# Patient Record
Sex: FEMALE | Race: ASIAN | NOT HISPANIC OR LATINO
[De-identification: names, ages, dates, MRNs, and addresses within clinical notes are randomized per-mention and may not be internally consistent; named-entity substitution may affect disease eponyms.]

---

## 2018-03-13 ENCOUNTER — APPOINTMENT (OUTPATIENT)
Dept: PULMONOLOGY | Facility: CLINIC | Age: 63
End: 2018-03-13
Payer: MEDICAID

## 2018-03-13 VITALS
RESPIRATION RATE: 16 BRPM | OXYGEN SATURATION: 98 % | HEIGHT: 64 IN | TEMPERATURE: 97.7 F | BODY MASS INDEX: 22.2 KG/M2 | WEIGHT: 130 LBS | HEART RATE: 56 BPM | DIASTOLIC BLOOD PRESSURE: 70 MMHG | SYSTOLIC BLOOD PRESSURE: 100 MMHG

## 2018-03-13 DIAGNOSIS — G47.33 OBSTRUCTIVE SLEEP APNEA (ADULT) (PEDIATRIC): ICD-10-CM

## 2018-03-13 PROCEDURE — 99204 OFFICE O/P NEW MOD 45 MIN: CPT

## 2018-04-29 ENCOUNTER — OUTPATIENT (OUTPATIENT)
Dept: OUTPATIENT SERVICES | Facility: HOSPITAL | Age: 63
LOS: 1 days | End: 2018-04-29
Payer: MEDICAID

## 2018-04-29 ENCOUNTER — APPOINTMENT (OUTPATIENT)
Dept: SLEEP CENTER | Facility: CLINIC | Age: 63
End: 2018-04-29
Payer: MEDICAID

## 2018-04-29 DIAGNOSIS — Z98.89 OTHER SPECIFIED POSTPROCEDURAL STATES: Chronic | ICD-10-CM

## 2018-04-29 PROCEDURE — 95810 POLYSOM 6/> YRS 4/> PARAM: CPT

## 2018-04-29 PROCEDURE — 95810 POLYSOM 6/> YRS 4/> PARAM: CPT | Mod: 26

## 2018-04-30 DIAGNOSIS — G47.33 OBSTRUCTIVE SLEEP APNEA (ADULT) (PEDIATRIC): ICD-10-CM

## 2018-05-17 ENCOUNTER — RESULT REVIEW (OUTPATIENT)
Age: 63
End: 2018-05-17

## 2018-10-24 ENCOUNTER — APPOINTMENT (OUTPATIENT)
Dept: UROLOGY | Facility: CLINIC | Age: 63
End: 2018-10-24
Payer: MEDICAID

## 2018-10-24 VITALS
OXYGEN SATURATION: 97 % | WEIGHT: 139 LBS | RESPIRATION RATE: 17 BRPM | HEART RATE: 61 BPM | DIASTOLIC BLOOD PRESSURE: 66 MMHG | SYSTOLIC BLOOD PRESSURE: 121 MMHG | HEIGHT: 61.81 IN | BODY MASS INDEX: 25.58 KG/M2 | TEMPERATURE: 97.6 F

## 2018-10-24 PROCEDURE — 99203 OFFICE O/P NEW LOW 30 MIN: CPT

## 2018-10-24 RX ORDER — PRAVASTATIN SODIUM 40 MG/1
40 TABLET ORAL
Qty: 30 | Refills: 0 | Status: COMPLETED | COMMUNITY
Start: 2017-11-30

## 2018-10-24 RX ORDER — ASPIRIN ENTERIC COATED TABLETS 81 MG 81 MG/1
81 TABLET, DELAYED RELEASE ORAL
Qty: 30 | Refills: 0 | Status: ACTIVE | COMMUNITY
Start: 2018-08-21

## 2018-10-24 RX ORDER — ERYTHROMYCIN 5 MG/G
5 OINTMENT OPHTHALMIC
Qty: 4 | Refills: 0 | Status: COMPLETED | COMMUNITY
Start: 2018-06-27

## 2018-10-24 RX ORDER — LANSOPRAZOLE 15 MG/1
15 CAPSULE, DELAYED RELEASE ORAL
Qty: 60 | Refills: 0 | Status: ACTIVE | COMMUNITY
Start: 2018-03-08

## 2018-10-24 RX ORDER — MAGNESIUM OXIDE 400 MG
400 (241.3 MG) TABLET ORAL
Qty: 30 | Refills: 0 | Status: ACTIVE | COMMUNITY
Start: 2018-10-17

## 2018-10-24 RX ORDER — ISOPROPYL ALCOHOL 70 G/100G
70 LIQUID TOPICAL
Qty: 100 | Refills: 0 | Status: COMPLETED | COMMUNITY
Start: 2018-01-02

## 2018-10-24 RX ORDER — LOSARTAN POTASSIUM AND HYDROCHLOROTHIAZIDE 25; 100 MG/1; MG/1
100-25 TABLET ORAL
Qty: 30 | Refills: 0 | Status: COMPLETED | COMMUNITY
Start: 2018-07-19

## 2018-10-24 RX ORDER — MELOXICAM 7.5 MG/1
7.5 TABLET ORAL
Qty: 30 | Refills: 0 | Status: COMPLETED | COMMUNITY
Start: 2018-10-17

## 2018-10-24 RX ORDER — BLOOD SUGAR DIAGNOSTIC
STRIP MISCELLANEOUS
Qty: 50 | Refills: 0 | Status: COMPLETED | COMMUNITY
Start: 2018-01-02

## 2018-10-24 RX ORDER — ATORVASTATIN CALCIUM 40 MG/1
40 TABLET, FILM COATED ORAL
Qty: 30 | Refills: 0 | Status: ACTIVE | COMMUNITY
Start: 2018-09-07

## 2018-10-24 RX ORDER — ISOSORBIDE MONONITRATE 30 MG/1
30 TABLET, EXTENDED RELEASE ORAL
Qty: 30 | Refills: 0 | Status: COMPLETED | COMMUNITY
Start: 2018-07-30

## 2018-10-24 RX ORDER — RANITIDINE 300 MG/1
300 TABLET ORAL
Qty: 30 | Refills: 0 | Status: COMPLETED | COMMUNITY
Start: 2018-01-27

## 2018-10-24 RX ORDER — POLYVINYL ALCOHOL 14 MG/ML
1.4 SOLUTION/ DROPS OPHTHALMIC
Qty: 15 | Refills: 0 | Status: ACTIVE | COMMUNITY
Start: 2018-01-25

## 2018-10-24 RX ORDER — LANCETS 30 GAUGE
EACH MISCELLANEOUS
Qty: 100 | Refills: 0 | Status: COMPLETED | COMMUNITY
Start: 2018-01-02

## 2018-10-24 RX ORDER — VITAMIN B COMPLEX
CAPSULE ORAL
Qty: 30 | Refills: 0 | Status: ACTIVE | COMMUNITY
Start: 2018-10-17

## 2018-10-24 RX ORDER — CLOPIDOGREL BISULFATE 75 MG/1
75 TABLET, FILM COATED ORAL
Qty: 30 | Refills: 0 | Status: DISCONTINUED | COMMUNITY
Start: 2018-08-21

## 2018-10-24 RX ORDER — ERGOCALCIFEROL 1.25 MG/1
1.25 MG CAPSULE, LIQUID FILLED ORAL
Qty: 4 | Refills: 0 | Status: ACTIVE | COMMUNITY
Start: 2018-04-19

## 2018-10-24 RX ORDER — TRIAMCINOLONE ACETONIDE 1 MG/G
0.1 CREAM TOPICAL
Qty: 80 | Refills: 0 | Status: COMPLETED | COMMUNITY
Start: 2018-06-27

## 2018-10-24 RX ORDER — VALSARTAN AND HYDROCHLOROTHIAZIDE 320; 25 MG/1; MG/1
320-25 TABLET, FILM COATED ORAL
Qty: 30 | Refills: 0 | Status: COMPLETED | COMMUNITY
Start: 2018-04-19

## 2018-10-26 LAB
APPEARANCE: CLEAR
BACTERIA UR CULT: NORMAL
BACTERIA: NEGATIVE
BILIRUBIN URINE: NEGATIVE
BLOOD URINE: NEGATIVE
COLOR: YELLOW
GLUCOSE QUALITATIVE U: NEGATIVE MG/DL
HYALINE CASTS: 0 /LPF
KETONES URINE: NEGATIVE
LEUKOCYTE ESTERASE URINE: NEGATIVE
MICROSCOPIC-UA: NORMAL
NITRITE URINE: NEGATIVE
PH URINE: 7.5
PROTEIN URINE: ABNORMAL MG/DL
RED BLOOD CELLS URINE: 4 /HPF
SPECIFIC GRAVITY URINE: 1.02
SQUAMOUS EPITHELIAL CELLS: 1 /HPF
UROBILINOGEN URINE: NEGATIVE MG/DL
WHITE BLOOD CELLS URINE: 1 /HPF

## 2018-12-19 ENCOUNTER — APPOINTMENT (OUTPATIENT)
Dept: UROGYNECOLOGY | Facility: CLINIC | Age: 63
End: 2018-12-19

## 2018-12-19 DIAGNOSIS — Z87.440 PERSONAL HISTORY OF URINARY (TRACT) INFECTIONS: ICD-10-CM

## 2019-11-13 ENCOUNTER — APPOINTMENT (OUTPATIENT)
Dept: UROGYNECOLOGY | Facility: CLINIC | Age: 64
End: 2019-11-13
Payer: MEDICAID

## 2019-11-13 VITALS
DIASTOLIC BLOOD PRESSURE: 70 MMHG | BODY MASS INDEX: 25.68 KG/M2 | HEART RATE: 60 BPM | WEIGHT: 136 LBS | HEIGHT: 61 IN | SYSTOLIC BLOOD PRESSURE: 145 MMHG

## 2019-11-13 PROCEDURE — 99204 OFFICE O/P NEW MOD 45 MIN: CPT | Mod: 25

## 2019-11-13 PROCEDURE — 51798 US URINE CAPACITY MEASURE: CPT

## 2019-11-13 NOTE — PHYSICAL EXAM
[Well developed] : well developed [No Acute Distress] : in no acute distress [Well Nourished] : ~L well nourished [Oriented x3] : oriented to person, place, and time [Good Hygeine] : demonstrates good hygeine [Normal Memory] : ~T memory was ~L unimpaired [Normal Mood/Affect] : mood and affect are normal [Respirations regular] : ~T respiratory rate was regular [Normal Lung Sounds] : the lungs were clear to auscultation [Rate & Rhythm Regular] : ~T heart rate and rhythm were normal [No Edema] : ~T edema was not present [Symmetrical] : the neck was ~L symmetrical [Supple] : ~T the neck demonstrated no ~M decrease in suppleness [Thyroid Normal] : the thyroid ~T showed no abnormalities [Normal Gait] : gait was normal [Labia Majora] : were normal [Labia Minora] : were normal [Bartholin's Gland] : both Bartholin's glands were normal  [Normal Appearance] : general appearance was normal [Atrophy] : atrophy [No Bleeding] : there was no active vaginal bleeding [Exam Deferred] : was deferred [Normal] : no abnormalities [Dry Mucosa] : dry mucosa [1] : 1 [Aa ____] : Aa [unfilled] [Ba ____] : Ba [unfilled] [C ____] : C [unfilled] [GH ____] : GH [unfilled] [PB ____] : PB [unfilled] [TVL ____] : TVL  [unfilled] [Ap ____] : Ap [unfilled] [Bp ____] : Bp [unfilled] [D ____] : D [unfilled] [] : II [Post Void Residual ____ml] : post void residual via catheterization was [unfilled] ml [Anxiety] : patient is not anxious [Cough] : no cough [Murmurs] : a ~P ~M murmur was heard [Dyspnea] : no dyspnea [Tracheal Deviation] : no tracheal deviation observed [Mass] : no ~M [unfilled] neck mass was observed [Varicose Veins] : no varicose veins observed [Tenderness] : ~T no ~M abdominal tenderness observed [Mass (___ Cm)] : no ~M [unfilled] abdominal mass was palpated [H/Smegaly] : no hepatosplenomegaly [Distended] : not distended [Estrogen Effect] : no estrogen effect was observed [Hernia] : no hernia observed

## 2019-11-13 NOTE — HISTORY OF PRESENT ILLNESS
[FreeTextEntry1] : The pt is a 63 y/o P2 with DENIS for yrs, bothersome level is 6/10.\par She leaks urine with laughing, coughing but not with urgency.\par She feels incomplete emptying of her bladder and needs to double void.\par She voids hrly with a medium volume and has nocturia 2-3x with a small volume for 20 yrs.  She is extremely bothered by this.\par Her liquid intake consists of 3-4 glasses of water,1 cup of tea\par She has a BM 2-3/wk. She often takes a laxative to have a BM and reports that she has no sensation to defecate.  Denies FI.  She is scheduled for colonoscopy is scheduled for next week.\par She denies gross hematuria, recurrent UTIs, hx of nephrolithiaisis, ?vaginal bulge,\par Her last PAP was 2016 , and denies a hx of abnormal PAP\par Her last intercourse was >10 yrs ago, reports dyspareunia. her  is not interested in having intercourse.\par She denies having abdominal surgery in the past.\par

## 2019-11-18 ENCOUNTER — RESULT REVIEW (OUTPATIENT)
Age: 64
End: 2019-11-18

## 2019-11-18 LAB
BACTERIA UR CULT: NORMAL
CYTOLOGY CVX/VAG DOC THIN PREP: ABNORMAL

## 2019-12-11 ENCOUNTER — APPOINTMENT (OUTPATIENT)
Dept: UROGYNECOLOGY | Facility: CLINIC | Age: 64
End: 2019-12-11
Payer: MEDICAID

## 2019-12-11 PROCEDURE — 51798 US URINE CAPACITY MEASURE: CPT

## 2019-12-11 PROCEDURE — 51729 CYSTOMETROGRAM W/VP&UP: CPT

## 2019-12-11 PROCEDURE — 51741 ELECTRO-UROFLOWMETRY FIRST: CPT

## 2019-12-11 PROCEDURE — 51784 ANAL/URINARY MUSCLE STUDY: CPT

## 2019-12-11 PROCEDURE — 51797 INTRAABDOMINAL PRESSURE TEST: CPT

## 2019-12-18 ENCOUNTER — APPOINTMENT (OUTPATIENT)
Dept: UROGYNECOLOGY | Facility: CLINIC | Age: 64
End: 2019-12-18
Payer: MEDICAID

## 2019-12-18 PROCEDURE — 99214 OFFICE O/P EST MOD 30 MIN: CPT

## 2019-12-18 NOTE — ASSESSMENT
[FreeTextEntry1] : Various tx options including pessary, pelvic floor PT and surgery were dw the pt via [place] .\par She opted for surgery.\par We discussed A&P repair with TVT or TVH, BSO with USLS with TVT.  \par She opted to have TVH, BSO, USLS, TVT and possible A&P repair in March as she is planning to go to [facility] in Feb to be with her daughter.

## 2019-12-19 ENCOUNTER — TRANSCRIPTION ENCOUNTER (OUTPATIENT)
Age: 64
End: 2019-12-19

## 2019-12-29 ENCOUNTER — MOBILE ON CALL (OUTPATIENT)
Age: 64
End: 2019-12-29

## 2019-12-30 ENCOUNTER — OTHER (OUTPATIENT)
Age: 64
End: 2019-12-30

## 2019-12-30 DIAGNOSIS — Z86.39 PERSONAL HISTORY OF OTHER ENDOCRINE, NUTRITIONAL AND METABOLIC DISEASE: ICD-10-CM

## 2019-12-30 DIAGNOSIS — Z83.3 FAMILY HISTORY OF DIABETES MELLITUS: ICD-10-CM

## 2020-01-13 ENCOUNTER — APPOINTMENT (OUTPATIENT)
Dept: UROGYNECOLOGY | Facility: CLINIC | Age: 65
End: 2020-01-13

## 2020-01-21 ENCOUNTER — APPOINTMENT (OUTPATIENT)
Dept: UROGYNECOLOGY | Facility: HOSPITAL | Age: 65
End: 2020-01-21

## 2020-01-22 ENCOUNTER — APPOINTMENT (OUTPATIENT)
Dept: UROGYNECOLOGY | Facility: CLINIC | Age: 65
End: 2020-01-22
Payer: MEDICAID

## 2020-01-29 ENCOUNTER — APPOINTMENT (OUTPATIENT)
Dept: UROGYNECOLOGY | Facility: CLINIC | Age: 65
End: 2020-01-29
Payer: MEDICAID

## 2020-01-29 DIAGNOSIS — N95.2 POSTMENOPAUSAL ATROPHIC VAGINITIS: ICD-10-CM

## 2020-01-29 DIAGNOSIS — N39.3 STRESS INCONTINENCE (FEMALE) (MALE): ICD-10-CM

## 2020-01-29 PROCEDURE — A4562: CPT

## 2020-01-29 PROCEDURE — 57160 INSERT PESSARY/OTHER DEVICE: CPT

## 2020-01-29 PROCEDURE — 99213 OFFICE O/P EST LOW 20 MIN: CPT | Mod: 25

## 2020-01-29 NOTE — PROCEDURE
[Good Fit] : fits well [Pessary Inserted] : inserted [None] : no bleeding [Erosion] : no evidence of erosion [FreeTextEntry1] : ring with support #1

## 2020-01-29 NOTE — HISTORY OF PRESENT ILLNESS
[FreeTextEntry1] : The pt is here for pessary fitting for stage 2 bothersome UV prolapse.\par She was ready to proceed with surgery but then decided to post pone as she is traveling to [place] till March.\par

## 2020-01-29 NOTE — ASSESSMENT
[FreeTextEntry1] : Pessary fitted.  She will f/u tomorrow for pessary teaching.\par She was encouraged to apply estrogen cream

## 2020-01-30 ENCOUNTER — APPOINTMENT (OUTPATIENT)
Dept: UROGYNECOLOGY | Facility: CLINIC | Age: 65
End: 2020-01-30
Payer: MEDICAID

## 2020-01-30 PROCEDURE — 99213 OFFICE O/P EST LOW 20 MIN: CPT

## 2020-02-01 NOTE — HISTORY OF PRESENT ILLNESS
[FreeTextEntry1] : The pt was fitted with size 1 ring with pessary yesterday for prolapse.\par Denies any issues with pessary. Denies any DENIS since placement.

## 2020-02-01 NOTE — ASSESSMENT
[FreeTextEntry1] : Patient taught pessary self care. Patient demonstrated proper removal and insertion by teach back.\par Patient to [place] in March for follow up after she has returned from her trip.

## 2020-02-01 NOTE — PHYSICAL EXAM
[Well developed] : well developed [Well Nourished] : ~L well nourished [Good Hygeine] : demonstrates good hygeine [Normal] : normal external genitalia [Atrophy] : atrophy [Labia Minora] : were normal [No Bleeding] : there was no active vaginal bleeding [No Acute Distress] : in acute distress

## 2020-03-11 ENCOUNTER — APPOINTMENT (OUTPATIENT)
Dept: UROGYNECOLOGY | Facility: CLINIC | Age: 65
End: 2020-03-11

## 2020-05-27 ENCOUNTER — APPOINTMENT (OUTPATIENT)
Dept: UROGYNECOLOGY | Facility: CLINIC | Age: 65
End: 2020-05-27
Payer: MEDICAID

## 2020-05-28 ENCOUNTER — APPOINTMENT (OUTPATIENT)
Dept: UROGYNECOLOGY | Facility: CLINIC | Age: 65
End: 2020-05-28
Payer: MEDICAID

## 2020-05-28 PROCEDURE — 99213 OFFICE O/P EST LOW 20 MIN: CPT

## 2020-05-31 NOTE — PHYSICAL EXAM
[Well Nourished] : ~L well nourished [Well developed] : well developed [Good Hygeine] : demonstrates good hygeine [Normal Memory] : ~T memory was ~L unimpaired [Oriented x3] : oriented to person, place, and time [Normal Mood/Affect] : mood and affect are normal [Labia Minora] : were normal [Labia Majora] : were normal [Normal Appearance] : general appearance was normal [No Bleeding] : there was no active vaginal bleeding [Normal] : normal [No Acute Distress] : in acute distress

## 2020-05-31 NOTE — HISTORY OF PRESENT ILLNESS
[FreeTextEntry1] : The pt is here for pessary f/u.\par She was fitted with #1 ring with support and taught to self manage.\par Her last visit was 4 months ago.\par Patient reports she lost per pessary/dislodged when traveling to [facility] in March 2020. Patient stated she feels much better without the pessary. Patient is able to urinate without any issues.

## 2020-12-16 PROBLEM — Z87.440 HISTORY OF URINARY TRACT INFECTION: Status: RESOLVED | Noted: 2018-12-19 | Resolved: 2020-12-16

## 2024-02-01 ENCOUNTER — APPOINTMENT (OUTPATIENT)
Dept: GYNECOLOGIC ONCOLOGY | Facility: CLINIC | Age: 69
End: 2024-02-01
Payer: MEDICARE

## 2024-02-01 VITALS
DIASTOLIC BLOOD PRESSURE: 74 MMHG | SYSTOLIC BLOOD PRESSURE: 136 MMHG | BODY MASS INDEX: 25.3 KG/M2 | HEIGHT: 61 IN | OXYGEN SATURATION: 98 % | WEIGHT: 134 LBS | RESPIRATION RATE: 18 BRPM | HEART RATE: 77 BPM | TEMPERATURE: 98 F

## 2024-02-01 PROCEDURE — 99204 OFFICE O/P NEW MOD 45 MIN: CPT

## 2024-02-01 RX ORDER — AMLODIPINE BESYLATE 10 MG/1
10 TABLET ORAL
Refills: 0 | Status: ACTIVE | COMMUNITY

## 2024-02-01 RX ORDER — FENOFIBRATE 54 MG/1
54 TABLET ORAL
Refills: 0 | Status: ACTIVE | COMMUNITY

## 2024-02-01 RX ORDER — TELMISARTAN AND HYDROCHLOROTHIAZIDE 80; 25 MG/1; MG/1
80-25 TABLET ORAL
Refills: 0 | Status: ACTIVE | COMMUNITY

## 2024-02-01 RX ORDER — METFORMIN ER 750 MG 750 MG/1
750 TABLET ORAL
Refills: 0 | Status: ACTIVE | COMMUNITY

## 2024-02-01 RX ORDER — ASPIRIN 81 MG/1
81 TABLET ORAL
Refills: 0 | Status: ACTIVE | COMMUNITY

## 2024-02-01 RX ORDER — ACYCLOVIR 50 MG/G
5 OINTMENT TOPICAL
Refills: 0 | Status: ACTIVE | COMMUNITY

## 2024-02-01 RX ORDER — ERYTHROMYCIN 5 MG/G
5 OINTMENT OPHTHALMIC
Refills: 0 | Status: ACTIVE | COMMUNITY

## 2024-02-01 RX ORDER — ICOSAPENT ETHYL 1000 MG/1
1 CAPSULE ORAL
Refills: 0 | Status: ACTIVE | COMMUNITY

## 2024-02-01 RX ORDER — ATORVASTATIN CALCIUM 10 MG/1
10 TABLET, FILM COATED ORAL
Refills: 0 | Status: ACTIVE | COMMUNITY

## 2024-02-01 RX ORDER — SENNOSIDES 8.6 MG TABLETS 8.6 MG/1
8.6 TABLET ORAL
Refills: 0 | Status: ACTIVE | COMMUNITY

## 2024-02-01 RX ORDER — HYDROCORTISONE 1 %
12 CREAM (GRAM) TOPICAL
Refills: 0 | Status: ACTIVE | COMMUNITY

## 2024-02-01 NOTE — REVIEW OF SYSTEMS
[Negative] : Musculoskeletal [FreeTextEntry6] : paranasal sinus issue [FreeTextEntry9] : sinus issue

## 2024-02-01 NOTE — HISTORY OF PRESENT ILLNESS
[FreeTextEntry1] : 69 y/o  LMP 2003 approximately. In 12/2022 patient noted that she scratched something and then skin broke down.  She saw a dermatologist and did a lot of topical applcations.  She noticed that it blew up.  Derm suggested her to see a gynecologist.    Dr Nora Aquino [address] [facility] [phone]  She was given a work up including pap test, sonogram etc.  No further intervention was needed.  She then went  back to her dermatologist  She said she was cared for mostly by Luz Colby NP at [facility], but the person in charge is  Dr Susanna Shepard. [phone]  She continued care with freezing,  topical cream etc. She was advised to change to cotton underwear et Finally a biopsy was done in 11/2023  Pathology report showed extramammary paget disease  Patient has significant itching.  2/23 mammogram  BIRAD 3 10/23 ultrasound BIRAD 3 right side 7/23 CT of paranasal sinus  congenital developmenta hypoplasia  applies medication to the nose etc 2023 colonoscopy benign polyp noted pap done with gyn Sonogram 2/2022 pelvis small fibroids normal ovaries  patient at one point scheduled with Dr Bean here in this clinic for a urogyn procedure, TVH BSO uterosacral ligament suspention and a TVT back in 2019.  She postponted the procedure and went on a vacation in [place].  She then opted not to have surgery.  She had a pessary in place fora few years.  She has not seen another urogyn since Dr Martínez moved on.  She would like to see a specialist again in this office.  We will try to set it up for her to see Dr. En Shepard.

## 2024-02-01 NOTE — PHYSICAL EXAM
[Chaperone Present] : A chaperone was present in the examining room during all aspects of the physical examination [Absent] : CVAT: absent [Abnormal] : External genitalia: Abnormal [Normal] : Recto-Vaginal Exam: Normal

## 2024-02-01 NOTE — ASSESSMENT
[FreeTextEntry1] : The patient has a diagnosis of extramammary Paget's disease of the vulva.  This was confirmed by her dermatologist recently. We need to bring the slides here for a review. Physical examination of the perineum showed that in her case the Paget's disease is quite subtle, located on the left labia majora.  Patient understand that this disease is usually treated with surgical excision.  However we will need to review and confirm this pathology report first In addition she will need a workup that will include a CT scan, mammogram report which she has completed already, Pap test the results of which we will obtain, colonoscopy which she completed last year.  We will get a CT scan of the chest, abdomen and pelvis.  We will also get some tumor marker blood test including , CEA and CA 19-9.  I refer her to see a urogynecologist here.  The patient had a urological procedure scheduled back in 2019 before she opted for the vacation instead.  I am not sure that her symptoms is severe enough to warrant a procedure given that the initial planned surgery date was back in 2019.  However I will leave that decision to  Of the wound the patient was seen in the next 2 weeks.  In the meantime we will reconvene in about 2 weeks after we have reviewed the slides.  Will also order some of the tests that I discussed earlier.  I answered all question.  She knows to return in 2 weeks with further discussion.

## 2024-02-01 NOTE — CHIEF COMPLAINT
[FreeTextEntry1] : here for evaluation of her extrammary paget's disease diagnosed by her dermatologist  extramammary

## 2024-02-14 ENCOUNTER — APPOINTMENT (OUTPATIENT)
Dept: UROLOGY | Facility: CLINIC | Age: 69
End: 2024-02-14

## 2024-03-14 ENCOUNTER — APPOINTMENT (OUTPATIENT)
Dept: GYNECOLOGIC ONCOLOGY | Facility: CLINIC | Age: 69
End: 2024-03-14
Payer: MEDICARE

## 2024-03-14 ENCOUNTER — LABORATORY RESULT (OUTPATIENT)
Age: 69
End: 2024-03-14

## 2024-03-14 VITALS
DIASTOLIC BLOOD PRESSURE: 77 MMHG | HEART RATE: 63 BPM | RESPIRATION RATE: 18 BRPM | WEIGHT: 134 LBS | SYSTOLIC BLOOD PRESSURE: 138 MMHG | BODY MASS INDEX: 25.32 KG/M2 | TEMPERATURE: 97.5 F | OXYGEN SATURATION: 97 %

## 2024-03-14 DIAGNOSIS — N95.0 POSTMENOPAUSAL BLEEDING: ICD-10-CM

## 2024-03-14 PROCEDURE — 56605 BIOPSY OF VULVA/PERINEUM: CPT

## 2024-03-14 PROCEDURE — 56606 BIOPSY OF VULVA/PERINEUM: CPT

## 2024-03-14 PROCEDURE — 58100 BIOPSY OF UTERUS LINING: CPT

## 2024-03-14 PROCEDURE — 99214 OFFICE O/P EST MOD 30 MIN: CPT | Mod: 25

## 2024-03-14 RX ORDER — LIDOCAINE 4% 4 G/100G
4 CREAM TOPICAL
Qty: 1 | Refills: 2 | Status: ACTIVE | COMMUNITY
Start: 2024-03-14 | End: 1900-01-01

## 2024-03-14 RX ORDER — LIDOCAINE 30 MG/G
3 CREAM TOPICAL
Qty: 1 | Refills: 2 | Status: DISCONTINUED | COMMUNITY
Start: 2024-03-14 | End: 2024-03-14

## 2024-03-14 RX ORDER — BACITRACIN 500 [IU]/G
500 OINTMENT TOPICAL TWICE DAILY
Qty: 1 | Refills: 2 | Status: ACTIVE | COMMUNITY
Start: 2024-03-14 | End: 1900-01-01

## 2024-03-14 NOTE — REASON FOR VISIT
[Other: _____] : [unfilled] [FreeTextEntry1] : Patient is here to discuss the final pathology report review of her vulvar biopsy She is here by herself

## 2024-03-14 NOTE — HISTORY OF PRESENT ILLNESS
[FreeTextEntry1] : Georgian  Bennett: [id number]  Patient continues to complain of stabbing pain in the vulvar area.  She describes no vaginal bleeding. She had a vulvar biopsy back in January 2024 She was seen by me on February 1 It took us over the last 6 weeks to get the pathology slides reviewed here at [facility].  In addition we also obtain unstained slides and noted to do some IHC before we can finalize the consultation slide report. Explained to patient that she has Paget's disease with area of invasion which we cannot determine the depth due to how the specimen was procured.  Her main complaint is her stabbing pain/discomfort which she said that pushing it down on the skin actually improved her symptoms.  Her colonoscopy was done in 2023 by Dr. Jorge gastroenterology.  We will try to get that report.

## 2024-03-14 NOTE — PROCEDURE
[Endometrial Biopsy] : an endometrial biopsy [Vulvar Biopsy] : a vulvar biopsy [Other: ___] : [unfilled] [Postmenopausal Bleeding] : postmenopausal bleeding [Patient] : the patient [1% Lidocaine ___(ml)] :  [unfilled]Uml of 1% Lidocaine [Betadine] : betadine [Silver Nitrate] : silver nitrate [Yes] : the specimen was sent to pathology [No Complications] : none [Tolerated Well] : the patient tolerated the procedure well [Post procedure instructions and information given] : post procedure instructions and information given and reviewed with patient. [FreeTextEntry1] : bacitracin ointment written lidocaine 4% to apply to the area given her c/o stabbing pain

## 2024-03-14 NOTE — ASSESSMENT
[FreeTextEntry1] : The patient has extramammary Paget's disease of the vulvar with her outside slide finally reviewed here at [facility]. Paget's disease was noted with evidence of invasion the depth of which is not clear from the biopsy. Patient also complained of increasing area of discomfort, as she pointed to the labia minora on both the left and right side.  There is no evidence of  any lesion of expansion in this area.  Patient uses a Q-tip to press the skin and cleaned that it may take pain less severe.  Discussed extensively with patient regarding the standard of care for this disease which would be a wide radical excision.  Given the possibility of invasive disease discussed extensively regarding the potential need for sentinel inguinal lymph node mapping and biopsy.  Discussed the rationale for the sentinel node evaluation versus a lymphadenectomy.  Discussed need for a metastatic workup. She will have a total body imaging. In addition we will obtain the actual report of her colonoscopy from 2023 to assess the biopsy results. For the biopsy of the labia minora on both sides were performed in addition to an endometrial biopsy given the staining that was noted at the time of the examination today.  Once we gather all of these information hopefully we can present the case at our tumor board conference and will proceed to the actual treatment that I hope will commence sometimes early April.  Answered all her questions with the Tajik . Will be happy to answer more question if she or her daughter has any.

## 2024-03-14 NOTE — REVIEW OF SYSTEMS
[Negative] : Musculoskeletal [FreeTextEntry4] : vulva pain/discomfort in the area where paget's disease is

## 2024-03-14 NOTE — LETTER BODY
[FreeTextEntry2] : Carlitos Vergara MD [address] [facility] [address] [place] [address] Tel [phone]  Dear Dr Veragra   Ms Noel Aquino was seen in my office for an evaluation after finally having her outside slide reviewed here at [facility]  Please see my consult note for her plan of care.  Thank you for allowing me to participate in the care of this patient.    Please do not hesitate to call if you have any questions.    Most Sincerely,      Yvon Martínez MD [facility] Director, [facility] Division of Gynecologic Oncology Department Obstetrics and Gynecology Tel [phone] [email or web address]  [Attached please find my note.] : Attached please find my note. [FreeTextEntry1] : consultation pathology report

## 2024-03-14 NOTE — PHYSICAL EXAM
[Chaperone Present] : A chaperone was present in the examining room during all aspects of the physical examination [Absent] : CVAT: absent [Labia Majora] : normal left labia majora [Labia Majora Erythema Left] : erythema on the left [Abnormal] : Cervix: Abnormal [Normal] : Recto-Vaginal Exam: Normal [FreeTextEntry1] : very subtle erythema and positive hyperpigmentation noted [Fully active, able to carry on all pre-disease performance without restriction] : Status 0 - Fully active, able to carry on all pre-disease performance without restriction

## 2024-03-15 LAB
ALBUMIN SERPL ELPH-MCNC: 4.8 G/DL
ALP BLD-CCNC: 101 U/L
ALT SERPL-CCNC: 24 U/L
ANION GAP SERPL CALC-SCNC: 13 MMOL/L
AST SERPL-CCNC: 16 U/L
BASOPHILS # BLD AUTO: 0.08 K/UL
BASOPHILS NFR BLD AUTO: 1.1 %
BILIRUB SERPL-MCNC: 0.3 MG/DL
BUN SERPL-MCNC: 17 MG/DL
CALCIUM SERPL-MCNC: 10 MG/DL
CEA SERPL-MCNC: 2.4 NG/ML
CHLORIDE SERPL-SCNC: 105 MMOL/L
CO2 SERPL-SCNC: 25 MMOL/L
CREAT SERPL-MCNC: 0.63 MG/DL
EGFR: 97 ML/MIN/1.73M2
EOSINOPHIL # BLD AUTO: 0.34 K/UL
EOSINOPHIL NFR BLD AUTO: 4.7 %
GLUCOSE SERPL-MCNC: 134 MG/DL
HCT VFR BLD CALC: 39.6 %
HGB BLD-MCNC: 13.2 G/DL
IMM GRANULOCYTES NFR BLD AUTO: 0.3 %
LYMPHOCYTES # BLD AUTO: 2.75 K/UL
LYMPHOCYTES NFR BLD AUTO: 37.9 %
MAN DIFF?: NORMAL
MCHC RBC-ENTMCNC: 32 PG
MCHC RBC-ENTMCNC: 33.3 GM/DL
MCV RBC AUTO: 95.9 FL
MONOCYTES # BLD AUTO: 0.4 K/UL
MONOCYTES NFR BLD AUTO: 5.5 %
NEUTROPHILS # BLD AUTO: 3.67 K/UL
NEUTROPHILS NFR BLD AUTO: 50.5 %
PLATELET # BLD AUTO: 329 K/UL
POTASSIUM SERPL-SCNC: 3.9 MMOL/L
PROT SERPL-MCNC: 7.6 G/DL
RBC # BLD: 4.13 M/UL
RBC # FLD: 12.4 %
SODIUM SERPL-SCNC: 143 MMOL/L
WBC # FLD AUTO: 7.26 K/UL

## 2024-03-19 LAB — HPV HIGH+LOW RISK DNA PNL CVX: DETECTED

## 2024-03-21 LAB — CORE LAB BIOPSY: NORMAL

## 2024-03-27 ENCOUNTER — NON-APPOINTMENT (OUTPATIENT)
Age: 69
End: 2024-03-27

## 2024-03-28 ENCOUNTER — APPOINTMENT (OUTPATIENT)
Dept: GYNECOLOGIC ONCOLOGY | Facility: CLINIC | Age: 69
End: 2024-03-28
Payer: MEDICARE

## 2024-03-28 VITALS
DIASTOLIC BLOOD PRESSURE: 70 MMHG | SYSTOLIC BLOOD PRESSURE: 147 MMHG | RESPIRATION RATE: 18 BRPM | BODY MASS INDEX: 25.32 KG/M2 | HEART RATE: 62 BPM | TEMPERATURE: 98.1 F | WEIGHT: 134 LBS | OXYGEN SATURATION: 96 %

## 2024-03-28 PROCEDURE — 99459 PELVIC EXAMINATION: CPT

## 2024-03-28 PROCEDURE — 99214 OFFICE O/P EST MOD 30 MIN: CPT

## 2024-03-28 NOTE — HISTORY OF PRESENT ILLNESS
[FreeTextEntry1] : Azeri  Kirby [id number]  Return today to discuss the final plan of management for her extramammary Paget's disease of the vulva The patient was first seen by me back on February 1, 2024 for a outside biopsy shows Paget's disease.  Her examination of the perineum was not very obvious for Paget's disease.  The patient had to point out the area where the biopsy was performed.  The treatment management unfortunately was not made immediately.  We waited for quite a bit of time in order to obtain the outside slides for review here at [facility].  Furthermore our dermatopathologist requested unstained slides for further examination such as IHC staining.  The final report here indicated a invasive Paget's disease of the vulva.  The patient came 2 weeks ago with complaint of significant stabbing pain in the perineal area which has now extended to the right side of the vulva.  Additional biopsy adjacent to the labia minora on both sides were performed.  Paget's disease was noted on the left labia minora next to the affected lesion.  The right sided vulvar biopsy was negative for disease.  The patient was given bacitracin and lidocaine cream for her stabbing pain in the perineum.  She reported improvement on her discomfort.  In her last visit we explained to patient that she has Paget's disease with area of invasion which we cannot determine the depth due to how the specimen was procured. Her main complaint was her stabbing pain/discomfort which she said that pushing it down on the skin actually improved her symptoms which has improved with the medication prescribed.  Her PET CT scan showed no evidence of any metastatic disease.  Her colonoscopy was done in 2023 by Dr. Jorge gastroenterology.  Her report is in the chart Her sonogram of the breast in 10/2 3 was BI-RADS 3 HPV testing 3/14/2024 was negative An endometrial biopsy on 3/14/2024 was also negative

## 2024-03-28 NOTE — LETTER BODY
[Attached please find my note.] : Attached please find my note. [FreeTextEntry2] : Carlitos Vergara MD [address] [facility] [address] [place] [address] Tel [phone]  Dear  Dr Vergara   Ms Noel Aquino was seen in my office for a an evaluation and discussion on management of her Paget's disease.  Please see my consult note for her plan of care.  Thank you for allowing me to participate in the care of this patient.   Please do not hesitate to call if you have any questions.    Most Sincerely,      Yvon Martínez MD [facility] Director, [facility] Division of Gynecologic Oncology Department Obstetrics and Gynecology Tel [phone] [email or web address]

## 2024-03-28 NOTE — ASSESSMENT
[FreeTextEntry1] : Turkish  Kirby [id number] Patient is a invasive Paget's disease of the vulva up to at least 1 mm based on the tissue sent off for assessment.  There is no evidence of any metastatic disease. The case was discussed at tumor board conference  Our recommendation for the patient is to have a wide radical resection of the vulvar tumor with bilateral sentinel lymph node mapping and biopsy given that the lesion is close to the midline.  I illustrated a diagram to the patient and outlined the procedure to the patient Discussed use of ICG dye to help with the sentinel node mapping Discussed use of antibiotics DVT prophylaxis and possible transfusion. Discussed length of procedure which is about 3 hours plus Discussed possible complication including risk of peripheral edema which is minimized with sentinel node evaluation Discussed the need to have an overnight stay in the hospital. Discussed postoperative care and instruction. We are hopeful that surgery is the only treatment that is necessary.  But we will wait for the final pathology report before making a decision. I answered all her questions.  Specifically she asked if there is any alternative such as oral medication to take.  Unfortunately there is not  She will have a PST date She will need a medical clearance Procedure will be performed at [facility] We are aiming for 4/5/2024, next Friday for the procedure She signed consent form here in the office with a [place]   Procedure  Wide radical local resection of the vulva with sentinel lymph node mapping and biopsy   Indication  Invasive Paget's disease of the vulva   Benefits  Removal of diseased tissue   Risks  Discussed risks of blood loss, infection, injury to adjacent organs, potential need for transfusion and other events.   Discussed alternatives  None that is equivalent

## 2024-03-28 NOTE — PHYSICAL EXAM
[Chaperone Present] : A chaperone was present in the examining room during all aspects of the physical examination [Abnormal] : External genitalia: Abnormal [Absent] : Adnexa(ae): Absent [Normal] : Recto-Vaginal Exam: Normal [de-identified] : The biopsy sites were healing well.  Patient's perineum especially on the left side was palpated again there appeared to be adequate tissue for a primary closure after a excision of the vulvar and the subcutaneous tissue. [de-identified] : Grossly normal [de-identified] : Anteverted nontender mobile [Fully active, able to carry on all pre-disease performance without restriction] : Status 0 - Fully active, able to carry on all pre-disease performance without restriction

## 2024-04-01 ENCOUNTER — NON-APPOINTMENT (OUTPATIENT)
Age: 69
End: 2024-04-01

## 2024-04-01 ENCOUNTER — APPOINTMENT (OUTPATIENT)
Dept: CARDIOLOGY | Facility: CLINIC | Age: 69
End: 2024-04-01
Payer: MEDICARE

## 2024-04-01 VITALS
WEIGHT: 134 LBS | BODY MASS INDEX: 25.32 KG/M2 | TEMPERATURE: 98 F | OXYGEN SATURATION: 96 % | DIASTOLIC BLOOD PRESSURE: 67 MMHG | HEART RATE: 75 BPM | RESPIRATION RATE: 18 BRPM | SYSTOLIC BLOOD PRESSURE: 128 MMHG

## 2024-04-01 DIAGNOSIS — I25.10 ATHEROSCLEROTIC HEART DISEASE OF NATIVE CORONARY ARTERY W/OUT ANGINA PECTORIS: ICD-10-CM

## 2024-04-01 DIAGNOSIS — Z95.820 PERIPHERAL VASCULAR ANGIOPLASTY STATUS WITH IMPLANTS AND GRAFTS: ICD-10-CM

## 2024-04-01 DIAGNOSIS — Z01.810 ENCOUNTER FOR PREPROCEDURAL CARDIOVASCULAR EXAMINATION: ICD-10-CM

## 2024-04-01 DIAGNOSIS — R06.02 SHORTNESS OF BREATH: ICD-10-CM

## 2024-04-01 PROCEDURE — 99204 OFFICE O/P NEW MOD 45 MIN: CPT | Mod: 25

## 2024-04-01 PROCEDURE — 93000 ELECTROCARDIOGRAM COMPLETE: CPT | Mod: NC,59

## 2024-04-01 PROCEDURE — 93015 CV STRESS TEST SUPVJ I&R: CPT

## 2024-04-01 NOTE — PHYSICAL EXAM
[Well Nourished] : well nourished [Well Developed] : well developed [No Acute Distress] : no acute distress [Normal Venous Pressure] : normal venous pressure [Normal Conjunctiva] : normal conjunctiva [No Carotid Bruit] : no carotid bruit [No Murmur] : no murmur [Normal S1, S2] : normal S1, S2 [Clear Lung Fields] : clear lung fields [No Gallop] : no gallop [No Rub] : no rub [Good Air Entry] : good air entry [No Respiratory Distress] : no respiratory distress  [No Masses/organomegaly] : no masses/organomegaly [Soft] : abdomen soft [Non Tender] : non-tender [No Edema] : no edema [Normal Gait] : normal gait [Normal Bowel Sounds] : normal bowel sounds [No Clubbing] : no clubbing [No Cyanosis] : no cyanosis [No Rash] : no rash [No Varicosities] : no varicosities [No Skin Lesions] : no skin lesions [Normal Speech] : normal speech [No Focal Deficits] : no focal deficits [Moves all extremities] : moves all extremities [Normal memory] : normal memory [Alert and Oriented] : alert and oriented

## 2024-04-01 NOTE — ASSESSMENT
[FreeTextEntry1] : 68 year old F with Paget's dz of vulva, CAD s/p stent (5-6 years ago), HTN, HLD, DM who presents for pre-op cardiac clearance for GYN-ONC surgery (wide radical resection of the vulvar tumor with bilateral sentinel lymph node mapping and biopsy).  Pt follows with a different cardiologist yearly and reportedly last underwent treadmill stress last year which was normal. She has a history of CAD s/p 1 stent 5-6 years ago and she states she has been doing well since then. She denies CP, palpitations, dizziness, orthopnea, PND, LE edema, or syncope. She does endorse chronic SOB after climbing 2 flights of stairs. She self-discontinued her ASA 81 for the past month in anticipation of surgery.  1) Pre-op cardiac clearance, for GYN surgery 4/5/24 2) CAD s/p stent 3) SOB - Her RCRI score is 2 which elevates her cardiac risk for planned GYN surgery. - EKG 4/1/24 showed sinus rhythm without significant abnormalities - Given CAD history and reports of SOB, I advised pt to undergo treadmill stress test 4/1/24; pt did 10:13 min Jorge protocol, achieved 13.4 METs, and had no ischemic stress ECG changes - There is no evidence of active ACS, arrhythmia, clinical heart failure, or significant valvular disease on physical exam. - She is medically optimized from cardiac standpoint and may proceed to planned GYN surgery without further cardiac testing.  - She self-discontinued ASA 81mg x 1 month in anticipation of surgery. I advised pt to resume as soon as safe from bleeding/surgery perspective.  4) Follow-up, as needed (pt has a cardiologist that she sees but could not get appointment prior to surgery)

## 2024-04-01 NOTE — REASON FOR VISIT
[Other: ____] : [unfilled] [Coronary Artery Disease] : coronary artery disease [Source: ______] : History obtained from [unfilled]

## 2024-04-01 NOTE — HISTORY OF PRESENT ILLNESS
[FreeTextEntry1] : 68 year old F with Paget's dz of vulva, CAD s/p stent (5-6 years ago), HTN, HLD, DM who presents for pre-op cardiac clearance for GYN-ONC surgery (wide radical resection of the vulvar tumor with bilateral sentinel lymph node mapping and biopsy).  Meds: ASA 81, atorva 10, fenofibrate 54 daily, amlodipine 10, telmisartan-hctz 80-25mg daily EKG 4/1/24 showed sinus rhythm without significant abnormalities  Pt follows with a different cardiologist yearly and reportedly last underwent treadmill stress last year which was normal. She has a history of CAD s/p 1 stent 5-6 years ago and she states she has been doing well since then. She denies CP, palpitations, dizziness, orthopnea, PND, LE edema, or syncope. She does endorse chronic SOB after climbing 2 flights of stairs. She self-discontinued her ASA 81 for the past month in anticipation of surgery.

## 2024-04-02 ENCOUNTER — OUTPATIENT (OUTPATIENT)
Dept: OUTPATIENT SERVICES | Facility: HOSPITAL | Age: 69
LOS: 1 days | End: 2024-04-02

## 2024-04-02 VITALS
HEIGHT: 62 IN | RESPIRATION RATE: 16 BRPM | TEMPERATURE: 97 F | HEART RATE: 62 BPM | WEIGHT: 132.06 LBS | SYSTOLIC BLOOD PRESSURE: 132 MMHG | DIASTOLIC BLOOD PRESSURE: 74 MMHG | OXYGEN SATURATION: 96 %

## 2024-04-02 DIAGNOSIS — C51.9 MALIGNANT NEOPLASM OF VULVA, UNSPECIFIED: ICD-10-CM

## 2024-04-02 DIAGNOSIS — Z98.890 OTHER SPECIFIED POSTPROCEDURAL STATES: Chronic | ICD-10-CM

## 2024-04-02 DIAGNOSIS — I10 ESSENTIAL (PRIMARY) HYPERTENSION: ICD-10-CM

## 2024-04-02 DIAGNOSIS — I25.10 ATHEROSCLEROTIC HEART DISEASE OF NATIVE CORONARY ARTERY WITHOUT ANGINA PECTORIS: ICD-10-CM

## 2024-04-02 DIAGNOSIS — E11.9 TYPE 2 DIABETES MELLITUS WITHOUT COMPLICATIONS: ICD-10-CM

## 2024-04-02 DIAGNOSIS — C44.99 OTHER SPECIFIED MALIGNANT NEOPLASM OF SKIN, UNSPECIFIED: ICD-10-CM

## 2024-04-02 DIAGNOSIS — Z98.89 OTHER SPECIFIED POSTPROCEDURAL STATES: Chronic | ICD-10-CM

## 2024-04-02 LAB
BLD GP AB SCN SERPL QL: NEGATIVE — SIGNIFICANT CHANGE UP
RH IG SCN BLD-IMP: POSITIVE — SIGNIFICANT CHANGE UP
RH IG SCN BLD-IMP: POSITIVE — SIGNIFICANT CHANGE UP

## 2024-04-02 RX ORDER — SODIUM CHLORIDE 9 MG/ML
1000 INJECTION, SOLUTION INTRAVENOUS
Refills: 0 | Status: DISCONTINUED | OUTPATIENT
Start: 2024-04-05 | End: 2024-04-05

## 2024-04-02 NOTE — H&P PST ADULT - NSICDXPASTMEDICALHX_GEN_ALL_CORE_FT
PAST MEDICAL HISTORY:  CAD (coronary artery disease)     DM (diabetes mellitus)     Essential hypertension     Gastroesophageal reflux disease without esophagitis     H/O Paget's disease of bone     Hyperlipidemia, unspecified hyperlipidemia type     Left knee pain, unspecified chronicity

## 2024-04-02 NOTE — H&P PST ADULT - GENITOURINARY COMMENTS
Hx not examined Hx extramammary Pagets disease of vulva - pt c/o of occasional stabbing pain in vulva / pruritis

## 2024-04-02 NOTE — H&P PST ADULT - PROBLEM SELECTOR PLAN 4
Pt has stopped ASA 81 mg 3-4 weeks ago on own - Cardio aware and wants ASA to restart postop as per surgeon

## 2024-04-02 NOTE — H&P PST ADULT - HISTORY OF PRESENT ILLNESS
61 y/o female from home in [place] with left knee pain. Had it for last 4 months, Pain intermittent with ambulation not on any pain meds at present. 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

## 2024-04-02 NOTE — H&P PST ADULT - ATTENDING COMMENTS
Met patient at the surgical suite  Patient already signed consent form in the office a week ago.  Reiterated the planned procedure  Discussed post operative care and instruction  Pain meds  Transfusion unlikely but will be prepared  Will proceed as planned

## 2024-04-02 NOTE — H&P PST ADULT - NSICDXPASTSURGICALHX_GEN_ALL_CORE_FT
PAST SURGICAL HISTORY:  H/O coronary angiogram     H/O shoulder surgery     S/P knee surgery right knee arthroscopy

## 2024-04-02 NOTE — H&P PST ADULT - PROBLEM SELECTOR PLAN 1
Pt scheduled for surgery and preop instructions including instructions for taking Famotidine and for Chlorhexidine use in showering on the day of surgery, given verbally and with use of  written materials, and patient confirming understanding of such instructions using  teach back method. Pt scheduled for surgery and preop instructions including instructions for taking Famotidine and for Chlorhexidine use in showering on the day of surgery, given verbally and with use of  written materials, and patient confirming understanding of such instructions using  teach back method.  Cardio evaluation in chart

## 2024-04-04 ENCOUNTER — TRANSCRIPTION ENCOUNTER (OUTPATIENT)
Age: 69
End: 2024-04-04

## 2024-04-05 ENCOUNTER — INPATIENT (INPATIENT)
Facility: HOSPITAL | Age: 69
LOS: 1 days | Discharge: HOME | End: 2024-04-07
Attending: OBSTETRICS & GYNECOLOGY | Admitting: OBSTETRICS & GYNECOLOGY
Payer: MEDICARE

## 2024-04-05 ENCOUNTER — RESULT REVIEW (OUTPATIENT)
Age: 69
End: 2024-04-05

## 2024-04-05 ENCOUNTER — APPOINTMENT (OUTPATIENT)
Dept: GYNECOLOGIC ONCOLOGY | Facility: HOSPITAL | Age: 69
End: 2024-04-05

## 2024-04-05 ENCOUNTER — TRANSCRIPTION ENCOUNTER (OUTPATIENT)
Age: 69
End: 2024-04-05

## 2024-04-05 VITALS
SYSTOLIC BLOOD PRESSURE: 110 MMHG | RESPIRATION RATE: 16 BRPM | OXYGEN SATURATION: 96 % | HEART RATE: 61 BPM | DIASTOLIC BLOOD PRESSURE: 65 MMHG | WEIGHT: 132.06 LBS | TEMPERATURE: 98 F | HEIGHT: 62 IN

## 2024-04-05 DIAGNOSIS — Z98.890 OTHER SPECIFIED POSTPROCEDURAL STATES: Chronic | ICD-10-CM

## 2024-04-05 DIAGNOSIS — Z98.89 OTHER SPECIFIED POSTPROCEDURAL STATES: Chronic | ICD-10-CM

## 2024-04-05 DIAGNOSIS — C44.99 OTHER SPECIFIED MALIGNANT NEOPLASM OF SKIN, UNSPECIFIED: ICD-10-CM

## 2024-04-05 LAB
GLUCOSE BLDC GLUCOMTR-MCNC: 147 MG/DL — HIGH (ref 70–99)
GLUCOSE BLDC GLUCOMTR-MCNC: 171 MG/DL — HIGH (ref 70–99)
GLUCOSE BLDC GLUCOMTR-MCNC: 176 MG/DL — HIGH (ref 70–99)
GLUCOSE BLDC GLUCOMTR-MCNC: 180 MG/DL — HIGH (ref 70–99)

## 2024-04-05 PROCEDURE — 56630 VULVECTOMY RADICAL PARTIAL: CPT

## 2024-04-05 PROCEDURE — 88307 TISSUE EXAM BY PATHOLOGIST: CPT | Mod: 26

## 2024-04-05 PROCEDURE — 38900 IO MAP OF SENT LYMPH NODE: CPT | Mod: 50

## 2024-04-05 PROCEDURE — 38531 OPEN BX/EXC INGUINOFEM NODES: CPT | Mod: 50

## 2024-04-05 RX ORDER — SODIUM CHLORIDE 9 MG/ML
1000 INJECTION, SOLUTION INTRAVENOUS
Refills: 0 | Status: DISCONTINUED | OUTPATIENT
Start: 2024-04-05 | End: 2024-04-06

## 2024-04-05 RX ORDER — OXYCODONE HYDROCHLORIDE 5 MG/1
2.5 TABLET ORAL
Refills: 0 | Status: DISCONTINUED | OUTPATIENT
Start: 2024-04-05 | End: 2024-04-07

## 2024-04-05 RX ORDER — DEXTROSE 10 % IN WATER 10 %
125 INTRAVENOUS SOLUTION INTRAVENOUS ONCE
Refills: 0 | Status: DISCONTINUED | OUTPATIENT
Start: 2024-04-05 | End: 2024-04-07

## 2024-04-05 RX ORDER — ACETAMINOPHEN 500 MG
1000 TABLET ORAL ONCE
Refills: 0 | Status: COMPLETED | OUTPATIENT
Start: 2024-04-06 | End: 2024-04-06

## 2024-04-05 RX ORDER — SODIUM CHLORIDE 9 MG/ML
1000 INJECTION, SOLUTION INTRAVENOUS
Refills: 0 | Status: DISCONTINUED | OUTPATIENT
Start: 2024-04-05 | End: 2024-04-07

## 2024-04-05 RX ORDER — OXYBUTYNIN CHLORIDE 5 MG
1 TABLET ORAL
Refills: 0 | DISCHARGE

## 2024-04-05 RX ORDER — OXYCODONE HYDROCHLORIDE 5 MG/1
5 TABLET ORAL
Refills: 0 | Status: DISCONTINUED | OUTPATIENT
Start: 2024-04-05 | End: 2024-04-07

## 2024-04-05 RX ORDER — GLUCAGON INJECTION, SOLUTION 0.5 MG/.1ML
1 INJECTION, SOLUTION SUBCUTANEOUS ONCE
Refills: 0 | Status: DISCONTINUED | OUTPATIENT
Start: 2024-04-05 | End: 2024-04-07

## 2024-04-05 RX ORDER — HEPARIN SODIUM 5000 [USP'U]/ML
5000 INJECTION INTRAVENOUS; SUBCUTANEOUS EVERY 8 HOURS
Refills: 0 | Status: DISCONTINUED | OUTPATIENT
Start: 2024-04-05 | End: 2024-04-07

## 2024-04-05 RX ORDER — ACETAMINOPHEN 500 MG
3 TABLET ORAL
Qty: 0 | Refills: 0 | DISCHARGE

## 2024-04-05 RX ORDER — ATORVASTATIN CALCIUM 80 MG/1
1 TABLET, FILM COATED ORAL
Refills: 0 | DISCHARGE

## 2024-04-05 RX ORDER — DEXTROSE 50 % IN WATER 50 %
25 SYRINGE (ML) INTRAVENOUS ONCE
Refills: 0 | Status: DISCONTINUED | OUTPATIENT
Start: 2024-04-05 | End: 2024-04-07

## 2024-04-05 RX ORDER — ONDANSETRON 8 MG/1
4 TABLET, FILM COATED ORAL ONCE
Refills: 0 | Status: DISCONTINUED | OUTPATIENT
Start: 2024-04-05 | End: 2024-04-05

## 2024-04-05 RX ORDER — DEXTROSE 50 % IN WATER 50 %
12.5 SYRINGE (ML) INTRAVENOUS ONCE
Refills: 0 | Status: DISCONTINUED | OUTPATIENT
Start: 2024-04-05 | End: 2024-04-07

## 2024-04-05 RX ORDER — ONDANSETRON 8 MG/1
4 TABLET, FILM COATED ORAL EVERY 8 HOURS
Refills: 0 | Status: DISCONTINUED | OUTPATIENT
Start: 2024-04-05 | End: 2024-04-07

## 2024-04-05 RX ORDER — FENTANYL CITRATE 50 UG/ML
50 INJECTION INTRAVENOUS ONCE
Refills: 0 | Status: DISCONTINUED | OUTPATIENT
Start: 2024-04-05 | End: 2024-04-05

## 2024-04-05 RX ORDER — OXYCODONE HYDROCHLORIDE 5 MG/1
5 TABLET ORAL
Refills: 0 | Status: DISCONTINUED | OUTPATIENT
Start: 2024-04-05 | End: 2024-04-05

## 2024-04-05 RX ORDER — OXYCODONE HYDROCHLORIDE 5 MG/1
1 TABLET ORAL
Qty: 5 | Refills: 0
Start: 2024-04-05

## 2024-04-05 RX ORDER — OXYCODONE HYDROCHLORIDE 5 MG/1
10 TABLET ORAL EVERY 4 HOURS
Refills: 0 | Status: DISCONTINUED | OUTPATIENT
Start: 2024-04-05 | End: 2024-04-05

## 2024-04-05 RX ORDER — SIMETHICONE 80 MG/1
80 TABLET, CHEWABLE ORAL EVERY 6 HOURS
Refills: 0 | Status: DISCONTINUED | OUTPATIENT
Start: 2024-04-05 | End: 2024-04-07

## 2024-04-05 RX ORDER — SENNA PLUS 8.6 MG/1
1 TABLET ORAL AT BEDTIME
Refills: 0 | Status: DISCONTINUED | OUTPATIENT
Start: 2024-04-05 | End: 2024-04-07

## 2024-04-05 RX ORDER — METOPROLOL TARTRATE 50 MG
5 TABLET ORAL EVERY 6 HOURS
Refills: 0 | Status: DISCONTINUED | OUTPATIENT
Start: 2024-04-05 | End: 2024-04-07

## 2024-04-05 RX ORDER — DEXTROSE 50 % IN WATER 50 %
15 SYRINGE (ML) INTRAVENOUS ONCE
Refills: 0 | Status: DISCONTINUED | OUTPATIENT
Start: 2024-04-05 | End: 2024-04-07

## 2024-04-05 RX ORDER — METFORMIN HYDROCHLORIDE 850 MG/1
1 TABLET ORAL
Refills: 0 | DISCHARGE

## 2024-04-05 RX ORDER — FENTANYL CITRATE 50 UG/ML
25 INJECTION INTRAVENOUS
Refills: 0 | Status: DISCONTINUED | OUTPATIENT
Start: 2024-04-05 | End: 2024-04-05

## 2024-04-05 RX ORDER — INSULIN LISPRO 100/ML
VIAL (ML) SUBCUTANEOUS AT BEDTIME
Refills: 0 | Status: DISCONTINUED | OUTPATIENT
Start: 2024-04-05 | End: 2024-04-07

## 2024-04-05 RX ORDER — TELMISARTAN AND HYDROCHLOROTHIAZIDE 40; 12.5 MG/1; MG/1
1 TABLET ORAL
Refills: 0 | DISCHARGE

## 2024-04-05 RX ORDER — KETOROLAC TROMETHAMINE 30 MG/ML
15 SYRINGE (ML) INJECTION EVERY 8 HOURS
Refills: 0 | Status: DISCONTINUED | OUTPATIENT
Start: 2024-04-05 | End: 2024-04-06

## 2024-04-05 RX ORDER — ACETAMINOPHEN 500 MG
1000 TABLET ORAL ONCE
Refills: 0 | Status: COMPLETED | OUTPATIENT
Start: 2024-04-05 | End: 2024-04-05

## 2024-04-05 RX ORDER — ACETAMINOPHEN 500 MG
1000 TABLET ORAL ONCE
Refills: 0 | Status: DISCONTINUED | OUTPATIENT
Start: 2024-04-06 | End: 2024-04-06

## 2024-04-05 RX ORDER — IBUPROFEN 200 MG
3 TABLET ORAL
Qty: 0 | Refills: 0 | DISCHARGE

## 2024-04-05 RX ORDER — INSULIN LISPRO 100/ML
VIAL (ML) SUBCUTANEOUS
Refills: 0 | Status: DISCONTINUED | OUTPATIENT
Start: 2024-04-05 | End: 2024-04-07

## 2024-04-05 RX ADMIN — Medication 15 MILLIGRAM(S): at 19:45

## 2024-04-05 RX ADMIN — Medication 400 MILLIGRAM(S): at 17:01

## 2024-04-05 RX ADMIN — SODIUM CHLORIDE 75 MILLILITER(S): 9 INJECTION, SOLUTION INTRAVENOUS at 21:27

## 2024-04-05 RX ADMIN — Medication 1: at 15:44

## 2024-04-05 RX ADMIN — HEPARIN SODIUM 5000 UNIT(S): 5000 INJECTION INTRAVENOUS; SUBCUTANEOUS at 21:33

## 2024-04-05 RX ADMIN — Medication 15 MILLIGRAM(S): at 18:45

## 2024-04-05 RX ADMIN — HEPARIN SODIUM 5000 UNIT(S): 5000 INJECTION INTRAVENOUS; SUBCUTANEOUS at 15:24

## 2024-04-05 NOTE — DISCHARGE NOTE PROVIDER - NSDCCPCAREPLAN_GEN_ALL_CORE_FT
PRINCIPAL DISCHARGE DIAGNOSIS  Diagnosis: Extramammary Paget disease  Assessment and Plan of Treatment:

## 2024-04-05 NOTE — BRIEF OPERATIVE NOTE - SPECIMENS
1. Left sentinel inguinal lymph node 2. Right sentinel inguinal lymph node 3. Left vulva with suture at 12 o'clock

## 2024-04-05 NOTE — DISCHARGE NOTE PROVIDER - NSDCCPTREATMENT_GEN_ALL_CORE_FT
PRINCIPAL PROCEDURE  Procedure: Partial radical vulvectomy with bilateral lymphadenectomy  Findings and Treatment:

## 2024-04-05 NOTE — DISCHARGE NOTE PROVIDER - NSDCFUSCHEDAPPT_GEN_ALL_CORE_FT
En [facility]pard  [facility]  UROLOGY [address] [facility]  Scheduled Appointment: 04/11/2024    Yvon Martínez  [facility] Physician [facility]  GYNONC [address]  Scheduled Appointment: 04/18/2024

## 2024-04-05 NOTE — PATIENT PROFILE ADULT - NSPROPTRIGHTNOTIFY_GEN_A_NUR
"              After Visit Summary   5/4/2018    Christina Naqvi    MRN: [id number]           Patient Information     Date Of Birth          5/16/1931        Visit Information        Provider Department      5/4/2018 5:00 AM Jarrod Bell MD [facility]        Today's Diagnoses     Physical deconditioning    -  1    Closed nondisplaced fracture of seventh cervical vertebra with routine healing, unspecified fracture morphology, subsequent encounter        Closed fracture of left wrist with routine healing, subsequent encounter        Hematoma of left hip, subsequent encounter        Essential hypertension        Other pulmonary embolism without acute cor pulmonale, unspecified chronicity (H)        HX: breast cancer           Follow-ups after your visit        Who to contact     If you have questions or need follow up information about today's clinic visit or your schedule please contact [facility] directly at [phone].  Normal or non-critical lab and imaging results will be communicated to you by PerformYardhart, letter or phone within 4 business days after the clinic has received the results. If you do not hear from us within 7 days, please contact the clinic through PerformYardhart or phone. If you have a critical or abnormal lab result, we will notify you by phone as soon as possible.  Submit refill requests through [employer] or call your pharmacy and they will forward the refill request to us. Please allow 3 business days for your refill to be completed.          Additional Information About Your Visit        MyChart Information     [employer] lets you send messages to your doctor, view your test results, renew your prescriptions, schedule appointments and more. To sign up, go to [email or web address] . Click on \"Log in\" on the left side of the screen, which will take you to the Welcome page. Then click on \"Sign up Now\" on the right side of the page.     You will be asked to enter the " "access code listed below, as well as some personal information. Please follow the directions to create your username and password.     Your access code is: [email or web address]  Expires: 2018  9:42 AM     Your access code will  in 90 days. If you need help or a new code, please call your [place] clinic or [phone].        Care EveryWhere ID     This is your Care EveryWhere ID. This could be used by other organizations to access your [place] medical records  [id number]        Your Vitals Were     Pulse Temperature Respirations Height Pulse Oximetry BMI (Body Mass Index)    96 99  F (37.2  C) 20 5' 2\" (1.575 m) 96% 30.29 kg/m2       Blood Pressure from Last 3 Encounters:   18 158/84   18 153/81   13 127/71    Weight from Last 3 Encounters:   18 165 lb 9.6 oz (75.1 kg)   18 170 lb 3.2 oz (77.2 kg)   13 166 lb 1.6 oz (75.3 kg)              Today, you had the following     No orders found for display       Primary Care Provider Office Phone # Fax #    Amelia Hurtado [phone]       [address] [address]        Equal Access to Services     [facility]: Hadii anthony ku hadasho Soomaali, waaxda luqadaha, qaybta kaalmada amber, deanna graff . So [facility] [phone].    ATENCIÓN: Si habla español, tiene a cox disposición servicios gratuitos de asistencia lingüística. Ole al [phone].    We comply with applicable federal civil rights laws and [place] laws. We do not discriminate on the basis of race, color, national origin, age, disability, sex, sexual orientation, or gender identity.            Thank you!     Thank you for choosing [facility]  for your care. Our goal is always to provide you with excellent care. Hearing back from our patients is one way we can continue to improve our services. Please take a few minutes to complete the written survey that you may " receive in the mail after your visit with us. Thank you!             Your Updated Medication List - Protect others around you: Learn how to safely use, store and throw away your medicines at www.disposemymeds.org.          This list is accurate as of 5/4/18  9:42 AM.  Always use your most recent med list.                   Brand Name Dispense Instructions for use Diagnosis    ACETAMINOPHEN PO      Take 650 mg by mouth 3 times daily        AMLODIPINE BESYLATE PO      Take 5 mg by mouth daily.        ANASTROZOLE PO      Take 1 mg by mouth daily        COENZYME Q-10 PO      Take 200 mg by mouth daily        LISINOPRIL PO      Take 40 mg by mouth daily        METAMUCIL FIBER PO      Take 1 packet by mouth daily        OMEGA-3 FATTY ACIDS PO      Take 1,200 mg by mouth daily        QUETIAPINE FUMARATE PO      Take 12.5 mg by mouth nightly as needed.        ROSUVASTATIN CALCIUM PO      Take 5 mg by mouth three times a week Monday, Wednesday, friday        sennosides 8.6 MG tablet    SENOKOT     Take 2 tablets by mouth 2 times daily as needed for constipation        ZETIA PO      Take 10 mg by mouth At Bedtime.           declines

## 2024-04-05 NOTE — BRIEF OPERATIVE NOTE - NSICDXBRIEFPROCEDURE_GEN_ALL_CORE_FT
PROCEDURES:  Partial radical vulvectomy with bilateral lymphadenectomy 05-Apr-2024 12:50:09  Saeid Renteria

## 2024-04-05 NOTE — DISCHARGE NOTE PROVIDER - CARE PROVIDER_API CALL
Yvno MartínezNima  Gynecologic Oncology  [address] [address] [place] [address], [place] [address]  Phone: [phone]  Fax: [phone]  Follow Up Time:

## 2024-04-05 NOTE — DISCHARGE NOTE PROVIDER - HOSPITAL COURSE
67yo woman presenting for scheduled left radical vulvectomy, SLND. Procedure performed without complication, EBL 50. Patient transferred to PACU in stable condition. Patient was admitted to GYN ONC.    On POD#1, ***    Patient was discharged on POD#XXXXX with stable vitals, tolerating PO, voiding spontaneously, and ambulating. Patient to have close outpatient follow-up at the [facility] gyn clinic. 67yo woman presenting for scheduled left radical vulvectomy, SLND. Procedure performed without complication, EBL 50. Patient transferred to PACU in stable condition. Patient was admitted to GYN ONC.    On POD#1, AM labs with stable H/H and gonzalez catheter was removed. Patient began voiding spontaneously without issue. Increased ambulation and tolerated regular diet. Vulvar incision well appearing.    Patient was discharged on POD#2 with stable vitals, tolerating PO, voiding spontaneously, and ambulating. Patient to have close outpatient follow-up at the [facility] gyn clinic. 67yo woman presenting for scheduled left radical vulvectomy, SLND. Procedure performed without complication, EBL 50. Patient transferred to PACU in stable condition. Patient was admitted to GYN ONC.    On POD#1, AM labs with stable H/H and gonzalez catheter was removed. Patient began voiding spontaneously without issue. Increased ambulation and tolerated regular diet. Vulvar incision well appearing.    Patient was discharged on POD#2 with stable vitals, tolerating PO, voiding spontaneously, and ambulating. Patient to have close outpatient follow-up with Dr. Martínez as scheduled 4/18/24 at 11am.

## 2024-04-05 NOTE — PATIENT PROFILE ADULT - FALL HARM RISK - RISK INTERVENTIONS
Assistance OOB with selected safe patient handling equipment/Assistance with ambulation/Communicate Fall Risk and Risk Factors to all staff, patient, and family/Discuss with provider need for PT consult/Monitor gait and stability/Reinforce activity limits and safety measures with patient and family/Visual Cue: Yellow wristband/Bed in lowest position, wheels locked, appropriate side rails in place/Call bell, personal items and telephone in reach/Instruct patient to call for assistance before getting out of bed or chair/Non-slip footwear when patient is out of bed/[place] to call system/Physically safe environment - no spills, clutter or unnecessary equipment/Purposeful Proactive Rounding/Room/bathroom lighting operational, light cord in reach

## 2024-04-05 NOTE — CHART NOTE - NSCHARTNOTEFT_GEN_A_CORE
GYNECOLOGIC ONCOLOGY PA Post Op NOTE    Patient seen and examined at bedside, resting comfortably with good pain control. Patient denies headache, dizziness, nausea, vomiting, chest pain, palpitations and sob.  Patient is tolerating clears.  Gonzalez in situ (clear, yellow urine in bag).    OBJECTIVE:     T(C): 36.7 (04-05-24 @ 14:00), Max: 36.7 (04-05-24 @ 05:47)  HR: 66 (04-05-24 @ 14:00) (61 - 71)  BP: 123/66 (04-05-24 @ 14:00) (110/65 - 131/75)  RR: 13 (04-05-24 @ 14:00) (11 - 20)  SpO2: 99% (04-05-24 @ 14:00) (91% - 100%)      04-05-24 @ 07:01  -  04-05-24 @ 15:21  --------------------------------------------------------  IN: 450 mL / OUT: 150 mL / NET: 300 mL        acetaminophen   IVPB .. 1000 milliGRAM(s) IV Intermittent once  dextrose 10% Bolus 125 milliLiter(s) IV Bolus once  dextrose 5%. 1000 milliLiter(s) IV Continuous <Continuous>  dextrose 5%. 1000 milliLiter(s) IV Continuous <Continuous>  dextrose 50% Injectable 25 Gram(s) IV Push once  dextrose 50% Injectable 12.5 Gram(s) IV Push once  dextrose Oral Gel 15 Gram(s) Oral once PRN  fentaNYL    Injectable 50 MICROGram(s) IV Push once PRN  fentaNYL    Injectable 25 MICROGram(s) IV Push every 5 minutes PRN  glucagon  Injectable 1 milliGRAM(s) IntraMuscular once  heparin   Injectable 5000 Unit(s) SubCutaneous every 8 hours  insulin lispro (ADMELOG) corrective regimen sliding scale   SubCutaneous three times a day before meals  insulin lispro (ADMELOG) corrective regimen sliding scale   SubCutaneous at bedtime  ketorolac   Injectable 15 milliGRAM(s) IV Push every 8 hours  lactated ringers. 1000 milliLiter(s) IV Continuous <Continuous>  ondansetron    Tablet 4 milliGRAM(s) Oral every 8 hours PRN  ondansetron Injectable 4 milliGRAM(s) IV Push once PRN  oxyCODONE    IR 5 milliGRAM(s) Oral every 3 hours PRN  oxyCODONE    IR 2.5 milliGRAM(s) Oral every 3 hours PRN  senna 1 Tablet(s) Oral at bedtime PRN  simethicone 80 milliGRAM(s) Chew every 6 hours PRN      Physical Exam:  Constitutional: NAD  Pulmonary: clear to auscultation bilaterally   Cardiovascular: Regular rate and rhythm   Abdomen: soft, non-distended, appropriately tender, binder in place  : Incision: w/dressing C/D/I , pad w/ small amount of blood stains  Extremities: no lower extremity edema or calve tenderness, bilat venodynes in place      ASSESSMENT/PLAN:  69yo female s/p Partial radical vulvectomy with bilateral lymphadenectomy in stable condition. See operative note for details.   -LR@75  -HSQ q 8hrs  -CC Regualr diet  -gonzalez catheter overnight  -IV Tylenol, Toradol, prn Oxycodone  - ISS and glucose monitoring  -bilateral venodynes when resting in bed  -encourage ambulation and IS use  -VS per routine  -Mylicon/Senna  -f/u am CBC/BMP/Mg/Phos-replete lytes prn     Disposition: admit to 4T for routine post operative care    Maria Elena Ceja PA-C  #[id number]

## 2024-04-05 NOTE — DISCHARGE NOTE PROVIDER - NSDCMRMEDTOKEN_GEN_ALL_CORE_FT
acetaminophen 325 mg oral tablet: 3 tab(s) orally every 6 hours  amLODIPine 10 mg oral tablet: 1 tab(s) orally once a day  atorvastatin 10 mg oral tablet: 1 tab(s) orally once a day (at bedtime)  B-Complex 1 tab daily:   ibuprofen 200 mg oral tablet: 3 tab(s) orally every 6 hours  Magnesium 400mg 1 tab daily:   metFORMIN 750 mg oral tablet, extended release: 1 tab(s) orally once a day (at bedtime)  Miebo ophthalmic solution: 1 drop(s) in each affected eye once a day  oxyBUTYnin 5 mg oral tablet: 1 tab(s) orally once a day (at bedtime)  oxyCODONE 5 mg oral tablet: 1 tab(s) orally every 6 hours MDD: 4 tab/day  telmisartan-hydrochlorothiazide 80 mg-25 mg oral tablet: 1 tab(s) orally once a day (in the morning)   acetaminophen 325 mg oral tablet: 3 tab(s) orally every 6 hours  amLODIPine 10 mg oral tablet: 1 tab(s) orally once a day  atorvastatin 10 mg oral tablet: 1 tab(s) orally once a day (at bedtime)  B-Complex 1 tab daily:   bacitracin 500 units/g topical ointment: 1 Apply topically to affected area 2 times a day  ibuprofen 200 mg oral tablet: 3 tab(s) orally every 6 hours  Magnesium 400mg 1 tab daily:   metFORMIN 750 mg oral tablet, extended release: 1 tab(s) orally once a day (at bedtime)  Miebo ophthalmic solution: 1 drop(s) in each affected eye once a day  oxyBUTYnin 5 mg oral tablet: 1 tab(s) orally once a day (at bedtime)  oxyCODONE 5 mg oral tablet: 1 tab(s) orally every 6 hours MDD: 4 tab/day  telmisartan-hydrochlorothiazide 80 mg-25 mg oral tablet: 1 tab(s) orally once a day (in the morning)

## 2024-04-05 NOTE — BRIEF OPERATIVE NOTE - OPERATION/FINDINGS
EUA with subtle discoloration/hypopigmentation along upper left vulva, approximately 3 x 2cm, otherwise normal appearing external genitalia. B/l sides with positive lymph node mapping.

## 2024-04-05 NOTE — DISCHARGE NOTE PROVIDER - NSDCFUADDINST_GEN_ALL_CORE_FT
Postoperative Instructions      Pain control     For pain control, take the followin. Motrin 600mg four times a day, take with food  2. Add Tylenol 975mg four times a day, alternated with motrin  3. Add oxycodone as needed for severe pain not managed well by motrin and tylenol. A prescription has been sent to your pharmacy on file.     Motrin and Tylenol can be obtained over the counter.    Postoperative restrictions   Do not drive or make important decisions for 24 hours after anesthesia. You may feel drowsy for 24 hours and should have a responsible adult with you during that time. Nothing in the vagina (tampons, sexual intercourse), No tub baths, pools or hot tubs for 6 weeks (showers are ok!). No lifting anything heavier than 15 lbs, no strenuous exercise for 6 weeks after surgery. Do not pull or cut any stitches that you see around your incision.  Wound care  Use sitz baths every time after going to the bathroom. Dry area with hair dryer on a low setting to keep the area clean and dry.        Vaginal bleeding   Spotting per vagina is normal in first few days after surgery. If you are soaking 1 pad per hour, that is not normal and you should notify your doctor's office and seek medical attention right away.        Signs of Infection  Some postoperative pain and discomfort is normal. However, if you experience any of the following, you may be developing an infection and should be seen by your doctor: pain that does not get better with the oral medications listed above, fever greater than 100.4F, foul smelling discharge coming from the surgical site, nausea and vomiting that does not stop (especially if you are unable to tolerate oral intake), or inability to urinate. If you experience any of these symptoms, call your doctor's office to be seen right away.    Follow Up  Call your doctor's office to schedule a postoperative appointment in 2 weeks. The results from the procedure will be discussed with you at that time.

## 2024-04-06 LAB
A1C WITH ESTIMATED AVERAGE GLUCOSE RESULT: 5.8 % — HIGH (ref 4–5.6)
ANION GAP SERPL CALC-SCNC: 12 MMOL/L — SIGNIFICANT CHANGE UP (ref 7–14)
BASOPHILS # BLD AUTO: 0.02 K/UL — SIGNIFICANT CHANGE UP (ref 0–0.2)
BASOPHILS NFR BLD AUTO: 0.2 % — SIGNIFICANT CHANGE UP (ref 0–2)
BUN SERPL-MCNC: 14 MG/DL — SIGNIFICANT CHANGE UP (ref 7–23)
CALCIUM SERPL-MCNC: 9.1 MG/DL — SIGNIFICANT CHANGE UP (ref 8.4–10.5)
CHLORIDE SERPL-SCNC: 105 MMOL/L — SIGNIFICANT CHANGE UP (ref 98–107)
CO2 SERPL-SCNC: 22 MMOL/L — SIGNIFICANT CHANGE UP (ref 22–31)
CREAT SERPL-MCNC: 0.55 MG/DL — SIGNIFICANT CHANGE UP (ref 0.5–1.3)
EGFR: 100 ML/MIN/1.73M2 — SIGNIFICANT CHANGE UP
EOSINOPHIL # BLD AUTO: 0.02 K/UL — SIGNIFICANT CHANGE UP (ref 0–0.5)
EOSINOPHIL NFR BLD AUTO: 0.2 % — SIGNIFICANT CHANGE UP (ref 0–6)
ESTIMATED AVERAGE GLUCOSE: 120 — SIGNIFICANT CHANGE UP
GLUCOSE BLDC GLUCOMTR-MCNC: 108 MG/DL — HIGH (ref 70–99)
GLUCOSE BLDC GLUCOMTR-MCNC: 143 MG/DL — HIGH (ref 70–99)
GLUCOSE BLDC GLUCOMTR-MCNC: 154 MG/DL — HIGH (ref 70–99)
GLUCOSE BLDC GLUCOMTR-MCNC: 173 MG/DL — HIGH (ref 70–99)
GLUCOSE SERPL-MCNC: 187 MG/DL — HIGH (ref 70–99)
HCT VFR BLD CALC: 33 % — LOW (ref 34.5–45)
HGB BLD-MCNC: 11.3 G/DL — LOW (ref 11.5–15.5)
IANC: 8.45 K/UL — HIGH (ref 1.8–7.4)
IMM GRANULOCYTES NFR BLD AUTO: 0.4 % — SIGNIFICANT CHANGE UP (ref 0–0.9)
LYMPHOCYTES # BLD AUTO: 1.76 K/UL — SIGNIFICANT CHANGE UP (ref 1–3.3)
LYMPHOCYTES # BLD AUTO: 15.7 % — SIGNIFICANT CHANGE UP (ref 13–44)
MAGNESIUM SERPL-MCNC: 2.1 MG/DL — SIGNIFICANT CHANGE UP (ref 1.6–2.6)
MCHC RBC-ENTMCNC: 31.8 PG — SIGNIFICANT CHANGE UP (ref 27–34)
MCHC RBC-ENTMCNC: 34.2 GM/DL — SIGNIFICANT CHANGE UP (ref 32–36)
MCV RBC AUTO: 93 FL — SIGNIFICANT CHANGE UP (ref 80–100)
MONOCYTES # BLD AUTO: 0.91 K/UL — HIGH (ref 0–0.9)
MONOCYTES NFR BLD AUTO: 8.1 % — SIGNIFICANT CHANGE UP (ref 2–14)
NEUTROPHILS # BLD AUTO: 8.45 K/UL — HIGH (ref 1.8–7.4)
NEUTROPHILS NFR BLD AUTO: 75.4 % — SIGNIFICANT CHANGE UP (ref 43–77)
NRBC # BLD: 0 /100 WBCS — SIGNIFICANT CHANGE UP (ref 0–0)
NRBC # FLD: 0 K/UL — SIGNIFICANT CHANGE UP (ref 0–0)
PHOSPHATE SERPL-MCNC: 2.7 MG/DL — SIGNIFICANT CHANGE UP (ref 2.5–4.5)
PLATELET # BLD AUTO: 269 K/UL — SIGNIFICANT CHANGE UP (ref 150–400)
POTASSIUM SERPL-MCNC: 3.6 MMOL/L — SIGNIFICANT CHANGE UP (ref 3.5–5.3)
POTASSIUM SERPL-SCNC: 3.6 MMOL/L — SIGNIFICANT CHANGE UP (ref 3.5–5.3)
RBC # BLD: 3.55 M/UL — LOW (ref 3.8–5.2)
RBC # FLD: 11.5 % — SIGNIFICANT CHANGE UP (ref 10.3–14.5)
SODIUM SERPL-SCNC: 139 MMOL/L — SIGNIFICANT CHANGE UP (ref 135–145)
WBC # BLD: 11.2 K/UL — HIGH (ref 3.8–10.5)
WBC # FLD AUTO: 11.2 K/UL — HIGH (ref 3.8–10.5)

## 2024-04-06 RX ORDER — ACETAMINOPHEN 500 MG
975 TABLET ORAL EVERY 6 HOURS
Refills: 0 | Status: DISCONTINUED | OUTPATIENT
Start: 2024-04-06 | End: 2024-04-07

## 2024-04-06 RX ORDER — SODIUM CHLORIDE 9 MG/ML
3 INJECTION INTRAMUSCULAR; INTRAVENOUS; SUBCUTANEOUS EVERY 8 HOURS
Refills: 0 | Status: DISCONTINUED | OUTPATIENT
Start: 2024-04-06 | End: 2024-04-07

## 2024-04-06 RX ORDER — POLYETHYLENE GLYCOL 3350 17 G/17G
17 POWDER, FOR SOLUTION ORAL DAILY
Refills: 0 | Status: DISCONTINUED | OUTPATIENT
Start: 2024-04-06 | End: 2024-04-07

## 2024-04-06 RX ORDER — IBUPROFEN 200 MG
600 TABLET ORAL EVERY 6 HOURS
Refills: 0 | Status: DISCONTINUED | OUTPATIENT
Start: 2024-04-06 | End: 2024-04-07

## 2024-04-06 RX ADMIN — Medication 600 MILLIGRAM(S): at 17:18

## 2024-04-06 RX ADMIN — Medication 1000 MILLIGRAM(S): at 01:07

## 2024-04-06 RX ADMIN — Medication 975 MILLIGRAM(S): at 17:24

## 2024-04-06 RX ADMIN — Medication 400 MILLIGRAM(S): at 06:28

## 2024-04-06 RX ADMIN — Medication 400 MILLIGRAM(S): at 00:07

## 2024-04-06 RX ADMIN — SODIUM CHLORIDE 3 MILLILITER(S): 9 INJECTION INTRAMUSCULAR; INTRAVENOUS; SUBCUTANEOUS at 22:22

## 2024-04-06 RX ADMIN — HEPARIN SODIUM 5000 UNIT(S): 5000 INJECTION INTRAVENOUS; SUBCUTANEOUS at 13:42

## 2024-04-06 RX ADMIN — Medication 600 MILLIGRAM(S): at 23:00

## 2024-04-06 RX ADMIN — POLYETHYLENE GLYCOL 3350 17 GRAM(S): 17 POWDER, FOR SOLUTION ORAL at 12:07

## 2024-04-06 RX ADMIN — Medication 600 MILLIGRAM(S): at 13:08

## 2024-04-06 RX ADMIN — SODIUM CHLORIDE 3 MILLILITER(S): 9 INJECTION INTRAMUSCULAR; INTRAVENOUS; SUBCUTANEOUS at 14:35

## 2024-04-06 RX ADMIN — Medication 975 MILLIGRAM(S): at 12:08

## 2024-04-06 RX ADMIN — Medication 600 MILLIGRAM(S): at 12:08

## 2024-04-06 RX ADMIN — Medication 15 MILLIGRAM(S): at 05:17

## 2024-04-06 RX ADMIN — HEPARIN SODIUM 5000 UNIT(S): 5000 INJECTION INTRAVENOUS; SUBCUTANEOUS at 21:48

## 2024-04-06 RX ADMIN — Medication 1: at 17:18

## 2024-04-06 RX ADMIN — Medication 975 MILLIGRAM(S): at 18:24

## 2024-04-06 RX ADMIN — HEPARIN SODIUM 5000 UNIT(S): 5000 INJECTION INTRAVENOUS; SUBCUTANEOUS at 05:22

## 2024-04-06 RX ADMIN — SIMETHICONE 80 MILLIGRAM(S): 80 TABLET, CHEWABLE ORAL at 12:07

## 2024-04-06 RX ADMIN — Medication 15 MILLIGRAM(S): at 06:17

## 2024-04-06 RX ADMIN — Medication 600 MILLIGRAM(S): at 18:18

## 2024-04-06 RX ADMIN — Medication 975 MILLIGRAM(S): at 13:08

## 2024-04-06 RX ADMIN — Medication 1000 MILLIGRAM(S): at 07:28

## 2024-04-06 NOTE — PROGRESS NOTE ADULT - SUBJECTIVE AND OBJECTIVE BOX
Gyn ONC Progress Note POD#2  HD#1    Subjective:   Pt seen and examined at bedside. No events overnight. Pain well controlled. Patient ambulating. Passing flatus. Tolerating regular diet. Pt denies fever, chills, chest pain, SOB, nausea, vomiting, lightheadedness, dizziness.      Objective:  T(F): 98 (04-06-24 @ 05:09), Max: 98.1 (04-05-24 @ 17:30)  HR: 62 (04-06-24 @ 05:09) (59 - 72)  BP: 138/57 (04-06-24 @ 05:09) (102/48 - 138/57)  RR: 16 (04-06-24 @ 05:09) (7 - 20)  SpO2: 98% (04-06-24 @ 05:09) (91% - 100%)    I&O's Summary    05 Apr 2024 07:01  -  06 Apr 2024 05:57  --------------------------------------------------------  IN: 1495 mL / OUT: 3440 mL / NET: -1945 mL      CAPILLARY BLOOD GLUCOSE  POCT Blood Glucose.: 180 mg/dL (05 Apr 2024 21:03)  POCT Blood Glucose.: 147 mg/dL (05 Apr 2024 17:46)  POCT Blood Glucose.: 171 mg/dL (05 Apr 2024 17:13)  POCT Blood Glucose.: 176 mg/dL (05 Apr 2024 15:38)      MEDICATIONS  (STANDING):  acetaminophen   IVPB .. 1000 milliGRAM(s) IV Intermittent once  acetaminophen   IVPB .. 1000 milliGRAM(s) IV Intermittent once  dextrose 10% Bolus 125 milliLiter(s) IV Bolus once  dextrose 5%. 1000 milliLiter(s) (100 mL/Hr) IV Continuous <Continuous>  dextrose 5%. 1000 milliLiter(s) (50 mL/Hr) IV Continuous <Continuous>  dextrose 50% Injectable 25 Gram(s) IV Push once  dextrose 50% Injectable 12.5 Gram(s) IV Push once  glucagon  Injectable 1 milliGRAM(s) IntraMuscular once  heparin   Injectable 5000 Unit(s) SubCutaneous every 8 hours  insulin lispro (ADMELOG) corrective regimen sliding scale   SubCutaneous three times a day before meals  insulin lispro (ADMELOG) corrective regimen sliding scale   SubCutaneous at bedtime  ketorolac   Injectable 15 milliGRAM(s) IV Push every 8 hours  lactated ringers. 1000 milliLiter(s) (75 mL/Hr) IV Continuous <Continuous>    MEDICATIONS  (PRN):  dextrose Oral Gel 15 Gram(s) Oral once PRN Blood Glucose LESS THAN 70 milliGRAM(s)/deciliter  metoprolol tartrate Injectable 5 milliGRAM(s) IV Push every 6 hours PRN BP systolic >160 or diastolic >110  ondansetron    Tablet 4 milliGRAM(s) Oral every 8 hours PRN Nausea and/or Vomiting  oxyCODONE    IR 5 milliGRAM(s) Oral every 3 hours PRN Severe Pain (7 - 10)  oxyCODONE    IR 2.5 milliGRAM(s) Oral every 3 hours PRN Moderate Pain (4 - 6)  senna 1 Tablet(s) Oral at bedtime PRN Constipation  simethicone 80 milliGRAM(s) Chew every 6 hours PRN Gas      Physical Exam:  Constitutional: NAD, A+O x3  CV: RR S1S2 no m/r/g  Lungs: CTA b/l, good air flow b/l  Abdomen: soft, softly-distended, appropriately-tender. no guarding, no rebound, normal bowel sounds  Incision: clean, dry, intact  Extremities: no lower extremity edema or calf tenderness bilaterally; venodynes in place           Gyn ONC Progress Note POD#2  HD#1    Subjective:   Pt seen and examined at bedside. No events overnight. Pain well controlled. Patient ambulating. Passing flatus. Tolerating regular diet. Pt denies fever, chills, chest pain, SOB, nausea, vomiting, lightheadedness, dizziness.      Objective:  T(F): 98 (04-06-24 @ 05:09), Max: 98.1 (04-05-24 @ 17:30)  HR: 62 (04-06-24 @ 05:09) (59 - 72)  BP: 138/57 (04-06-24 @ 05:09) (102/48 - 138/57)  RR: 16 (04-06-24 @ 05:09) (7 - 20)  SpO2: 98% (04-06-24 @ 05:09) (91% - 100%)    I&O's Summary    05 Apr 2024 07:01  -  06 Apr 2024 05:57  --------------------------------------------------------  IN: 1495 mL / OUT: 3440 mL / NET: -1945 mL      CAPILLARY BLOOD GLUCOSE  POCT Blood Glucose.: 180 mg/dL (05 Apr 2024 21:03)  POCT Blood Glucose.: 147 mg/dL (05 Apr 2024 17:46)  POCT Blood Glucose.: 171 mg/dL (05 Apr 2024 17:13)  POCT Blood Glucose.: 176 mg/dL (05 Apr 2024 15:38)      MEDICATIONS  (STANDING):  acetaminophen   IVPB .. 1000 milliGRAM(s) IV Intermittent once  acetaminophen   IVPB .. 1000 milliGRAM(s) IV Intermittent once  dextrose 10% Bolus 125 milliLiter(s) IV Bolus once  dextrose 5%. 1000 milliLiter(s) (100 mL/Hr) IV Continuous <Continuous>  dextrose 5%. 1000 milliLiter(s) (50 mL/Hr) IV Continuous <Continuous>  dextrose 50% Injectable 25 Gram(s) IV Push once  dextrose 50% Injectable 12.5 Gram(s) IV Push once  glucagon  Injectable 1 milliGRAM(s) IntraMuscular once  heparin   Injectable 5000 Unit(s) SubCutaneous every 8 hours  insulin lispro (ADMELOG) corrective regimen sliding scale   SubCutaneous three times a day before meals  insulin lispro (ADMELOG) corrective regimen sliding scale   SubCutaneous at bedtime  ketorolac   Injectable 15 milliGRAM(s) IV Push every 8 hours  lactated ringers. 1000 milliLiter(s) (75 mL/Hr) IV Continuous <Continuous>    MEDICATIONS  (PRN):  dextrose Oral Gel 15 Gram(s) Oral once PRN Blood Glucose LESS THAN 70 milliGRAM(s)/deciliter  metoprolol tartrate Injectable 5 milliGRAM(s) IV Push every 6 hours PRN BP systolic >160 or diastolic >110  ondansetron    Tablet 4 milliGRAM(s) Oral every 8 hours PRN Nausea and/or Vomiting  oxyCODONE    IR 5 milliGRAM(s) Oral every 3 hours PRN Severe Pain (7 - 10)  oxyCODONE    IR 2.5 milliGRAM(s) Oral every 3 hours PRN Moderate Pain (4 - 6)  senna 1 Tablet(s) Oral at bedtime PRN Constipation  simethicone 80 milliGRAM(s) Chew every 6 hours PRN Gas      Physical Exam:  Constitutional: NAD, A+O x3  CV: extremities well perfused  Lungs: normal [id number] effort on room air, good air flow b/l  Abdomen: soft, nondistended, nontender  : minimal bleeding on pad  Extremities: no lower extremity edema or calf tenderness bilaterally; venodynes in place           Gyn ONC Progress Note POD#2  HD#1    Subjective:   Pt seen and examined at bedside. No events overnight. Pain well controlled. Minimal ambulation OOB and not yet passing flatus. Tolerating regular diet. Pt denies fever, chills, chest pain, SOB, nausea, vomiting, lightheadedness, dizziness.      Objective:  T(F): 98 (04-06-24 @ 05:09), Max: 98.1 (04-05-24 @ 17:30)  HR: 62 (04-06-24 @ 05:09) (59 - 72)  BP: 138/57 (04-06-24 @ 05:09) (102/48 - 138/57)  RR: 16 (04-06-24 @ 05:09) (7 - 20)  SpO2: 98% (04-06-24 @ 05:09) (91% - 100%)    I&O's Summary    05 Apr 2024 07:01  -  06 Apr 2024 05:57  --------------------------------------------------------  IN: 1495 mL / OUT: 3440 mL / NET: -1945 mL      CAPILLARY BLOOD GLUCOSE  POCT Blood Glucose.: 180 mg/dL (05 Apr 2024 21:03)  POCT Blood Glucose.: 147 mg/dL (05 Apr 2024 17:46)  POCT Blood Glucose.: 171 mg/dL (05 Apr 2024 17:13)  POCT Blood Glucose.: 176 mg/dL (05 Apr 2024 15:38)      MEDICATIONS  (STANDING):  acetaminophen   IVPB .. 1000 milliGRAM(s) IV Intermittent once  acetaminophen   IVPB .. 1000 milliGRAM(s) IV Intermittent once  dextrose 10% Bolus 125 milliLiter(s) IV Bolus once  dextrose 5%. 1000 milliLiter(s) (100 mL/Hr) IV Continuous <Continuous>  dextrose 5%. 1000 milliLiter(s) (50 mL/Hr) IV Continuous <Continuous>  dextrose 50% Injectable 25 Gram(s) IV Push once  dextrose 50% Injectable 12.5 Gram(s) IV Push once  glucagon  Injectable 1 milliGRAM(s) IntraMuscular once  heparin   Injectable 5000 Unit(s) SubCutaneous every 8 hours  insulin lispro (ADMELOG) corrective regimen sliding scale   SubCutaneous three times a day before meals  insulin lispro (ADMELOG) corrective regimen sliding scale   SubCutaneous at bedtime  ketorolac   Injectable 15 milliGRAM(s) IV Push every 8 hours  lactated ringers. 1000 milliLiter(s) (75 mL/Hr) IV Continuous <Continuous>    MEDICATIONS  (PRN):  dextrose Oral Gel 15 Gram(s) Oral once PRN Blood Glucose LESS THAN 70 milliGRAM(s)/deciliter  metoprolol tartrate Injectable 5 milliGRAM(s) IV Push every 6 hours PRN BP systolic >160 or diastolic >110  ondansetron    Tablet 4 milliGRAM(s) Oral every 8 hours PRN Nausea and/or Vomiting  oxyCODONE    IR 5 milliGRAM(s) Oral every 3 hours PRN Severe Pain (7 - 10)  oxyCODONE    IR 2.5 milliGRAM(s) Oral every 3 hours PRN Moderate Pain (4 - 6)  senna 1 Tablet(s) Oral at bedtime PRN Constipation  simethicone 80 milliGRAM(s) Chew every 6 hours PRN Gas      Physical Exam:  Constitutional: NAD, A+O x3  CV: extremities well perfused  Lungs: normal respiratory effort on room air, good air flow b/l  Abdomen: softly distended, nontender. B/l inguinal incisions c/d/i with overlying bandages  : vulvar incision c/d/i with no surrounding erythema or swelling. Claros in place.  Extremities: no lower extremity edema or calf tenderness bilaterally; venodynes in place

## 2024-04-06 NOTE — PROGRESS NOTE ADULT - NSPROGADDITIONALINFOA_GEN_ALL_CORE
Gyn Onc Fellow Addendum    Ms. Aquino is a 67 yo with extramammary Paget's disease POD#1 s/p L radical vulvectomy, b/l SLND    VS reviewed. AM labs pending  Pain well controlled, continue current regimen  Encouraged ISS  Tolerating reg diet, encouraged ambulation.   Incision sites c/d/i. Bacitracin applied  DVT ppx: venodynes, SQH TID    Dispo: consider dc today Gyn Onc Fellow Addendum    Ms. Aquino is a 67 yo with extramammary Paget's disease POD#1 s/p L radical vulvectomy, b/l SLND    VS reviewed. AM labs pending  Pain well controlled, continue current regimen  Encouraged ISS  Tolerating reg diet, encouraged ambulation.   UOP adequate, plan for TOV  Incision sites c/d/i. Bacitracin applied  DVT ppx: venodynes, SQH TID    Dispo: consider dc today pending TOV Gyn Onc Fellow Addendum    Ms. Aquino is a 69 yo with extramammary Paget's disease POD#1 s/p L radical vulvectomy, b/l SLND    VS reviewed. AM labs pending  Pain well controlled, transition to PO meds  Encouraged ISS  Tolerating reg diet, encouraged ambulation.   UOP adequate, plan for TOV  Incision sites c/d/i. Bacitracin applied  DVT ppx: venodynes, SQH TID    Dispo: consider dc today pending TOV    Judith Del Toro MD Gyn Onc Fellow Addendum    Ms. Aquino is a 69 yo with extramammary Paget's disease POD#1 s/p L radical vulvectomy, b/l SLND    VS reviewed. AM labs pending  Pain well controlled, transition to PO meds  Encouraged ISS  Tolerating reg diet, encouraged ambulation.   UOP adequate, plan for TOV  Incision sites c/d/i. Bacitracin applied  DVT ppx: venodynes, SQH TID    Dispo: consider dc today/tomorrow    Judith Del Toro MD

## 2024-04-06 NOTE — PROGRESS NOTE ADULT - ASSESSMENT
Assessment/Plan: 68y POD#1, s/p scheduled left radical vulvectomy and SLND, uncomplicated. Patient is stable and recovering well in acute postop state.    Neuro: PCA for pain control vs. Continue PO pain medication  CV: Hemodynamically stable  Pulm: Saturating well on RA. Increase incentive spirometry.  GI: NPO vs Clear liquid diet vs Regular diet  : Claros in place vs Voiding spontaneously. Adequate UOP  Heme: Continue HSQ/Lovenox/Venodynes for DVT ppx. Increase OOB.    ID: Afebrile  FEN: LR@125, replete electrolytes prn  Dispo: continue inpatient care    Cem Castaneda PGY2 Assessment/Plan: 68y POD#1, s/p scheduled left radical vulvectomy and SLND, uncomplicated. Patient is stable and recovering well in acute postop state.    Neuro: Pain well controlled with IV tylenol, Toradol  CV: Hemodynamically stable  #h/o HTN: continue lopressor prn for severe range  Pulm: Saturating well on RA. Increase incentive spirometry.  GI: Regular diet, senna, simethicone, zofran prn  : Claros in place with 260cc UOP overnight, likely plan to d/c this AM after labs result  Heme: Continue HSQ for DVT ppx. Increase OOB.    ID: Afebrile  FEN: LR@125, replete electrolytes prn. Plan to SLIV after spontaneous void  Endo: no acute issues  #h/o T2DM: continue insulin sliding scale  Dispo: continue inpatient care    Cem Castaneda PGY2 Assessment/Plan: 68y POD#1, s/p scheduled left radical vulvectomy and SLND, uncomplicated. Patient is stable and recovering well in acute postop state.    Neuro: Pain well controlled with IV Tylenol, Toradol  CV: Hemodynamically stable  #h/o HTN: continue lopressor prn for severe range  Pulm: Saturating well on RA. Increase incentive spirometry.  GI: Regular diet, senna, simethicone, zofran prn  : Claros in place with 260cc UOP overnight, likely plan to d/c this AM after labs result  Heme: Continue HSQ for DVT ppx. Increase OOB.    ID: Afebrile  FEN: LR@125, replete electrolytes prn. Plan to SLIV after spontaneous void  Endo: no acute issues  #h/o T2DM: continue insulin sliding scale  Dispo: continue inpatient care    Cem Castaneda PGY2

## 2024-04-07 ENCOUNTER — TRANSCRIPTION ENCOUNTER (OUTPATIENT)
Age: 69
End: 2024-04-07

## 2024-04-07 VITALS
SYSTOLIC BLOOD PRESSURE: 148 MMHG | HEART RATE: 54 BPM | TEMPERATURE: 98 F | RESPIRATION RATE: 16 BRPM | DIASTOLIC BLOOD PRESSURE: 68 MMHG | OXYGEN SATURATION: 99 %

## 2024-04-07 LAB — GLUCOSE BLDC GLUCOMTR-MCNC: 100 MG/DL — HIGH (ref 70–99)

## 2024-04-07 RX ORDER — BACITRACIN ZINC 500 UNIT/G
1 OINTMENT IN PACKET (EA) TOPICAL
Qty: 0 | Refills: 0 | DISCHARGE
Start: 2024-04-07

## 2024-04-07 RX ORDER — BACITRACIN ZINC 500 UNIT/G
1 OINTMENT IN PACKET (EA) TOPICAL
Refills: 0 | Status: DISCONTINUED | OUTPATIENT
Start: 2024-04-07 | End: 2024-04-07

## 2024-04-07 RX ADMIN — Medication 975 MILLIGRAM(S): at 12:30

## 2024-04-07 RX ADMIN — Medication 975 MILLIGRAM(S): at 00:22

## 2024-04-07 RX ADMIN — Medication 600 MILLIGRAM(S): at 06:23

## 2024-04-07 RX ADMIN — Medication 975 MILLIGRAM(S): at 01:22

## 2024-04-07 RX ADMIN — Medication 975 MILLIGRAM(S): at 11:30

## 2024-04-07 RX ADMIN — Medication 600 MILLIGRAM(S): at 11:29

## 2024-04-07 RX ADMIN — Medication 600 MILLIGRAM(S): at 00:00

## 2024-04-07 RX ADMIN — Medication 975 MILLIGRAM(S): at 07:11

## 2024-04-07 RX ADMIN — Medication 600 MILLIGRAM(S): at 12:30

## 2024-04-07 RX ADMIN — Medication 1 APPLICATION(S): at 07:43

## 2024-04-07 RX ADMIN — POLYETHYLENE GLYCOL 3350 17 GRAM(S): 17 POWDER, FOR SOLUTION ORAL at 11:28

## 2024-04-07 RX ADMIN — SODIUM CHLORIDE 3 MILLILITER(S): 9 INJECTION INTRAMUSCULAR; INTRAVENOUS; SUBCUTANEOUS at 06:05

## 2024-04-07 RX ADMIN — HEPARIN SODIUM 5000 UNIT(S): 5000 INJECTION INTRAVENOUS; SUBCUTANEOUS at 05:23

## 2024-04-07 RX ADMIN — Medication 975 MILLIGRAM(S): at 06:11

## 2024-04-07 RX ADMIN — Medication 600 MILLIGRAM(S): at 05:23

## 2024-04-07 NOTE — PROGRESS NOTE ADULT - NSPROGADDITIONALINFOA_GEN_ALL_CORE
Gyn Onc Fellow Addendum    Ms. Aquino is a 67 yo with extramammary Paget's disease POD#2 s/p L radical vulvectomy, b/l SLND    VS reviewed  Pain well controlled, continue current regimen  Encouraged ISS  Tolerating reg diet, encouraged ambulation.   Voiding spontaneously  Incision sites c/d/i. Bacitracin applied  DVT ppx: venodynes, SQH TID    Dispo: dc today if remains clinically stable    Judith Del Toro MD

## 2024-04-07 NOTE — PROGRESS NOTE ADULT - ASSESSMENT
Assessment/Plan: 68y POD#2, s/p scheduled left radical vulvectomy and SLND, uncomplicated. Patient is stable and recovering well in acute postop state.    Neuro: Pain well controlled with PO Tylenol, Motrin, Oxy PRN  CV: Hemodynamically stable  #h/o HTN: continue lopressor prn for severe range  Pulm: Saturating well on RA. Increase incentive spirometry.  GI: Regular diet, senna, simethicone, zofran prn  : Voiding spontaneously with adequate UOP  Heme: Continue HSQ for DVT ppx. Increase OOB.    ID: Afebrile  FEN: SLIV  Endo: no acute issues  #h/o T2DM: continue insulin sliding scale  Dispo: discharge planning, anticipate dc to home today 4/7    Cem Castaneda PGY2

## 2024-04-07 NOTE — DISCHARGE NOTE NURSING/CASE MANAGEMENT/SOCIAL WORK - PATIENT PORTAL LINK FT
You can access the FollowMyHealth Patient Portal offered by [facility] by registering at the following website: [email or web address]. By joining [employer]’s FollowMyHealth portal, you will also be able to view your health information using other applications (apps) compatible with our system.

## 2024-04-07 NOTE — PROGRESS NOTE ADULT - ATTENDING COMMENTS
Patient seen and examined at bedside, agree with above gyn resident note, including assessment and plan. Incisions c/d/i. Discussed today's plan of care with patient, all questions answered. Continue routine postop care, d/c home today. Instructions reviewed.
Patient seen and examined at bedside, agree with above note, including assessment and plan. Incisions intact.  Discussed today's plan of care with patient, all questions answered. She is uncomfortable going home today and requests to stay until tomorrow. Continue routine postop care.

## 2024-04-07 NOTE — PROGRESS NOTE ADULT - SUBJECTIVE AND OBJECTIVE BOX
Gyn ONC Progress Note POD#2  HD#3    Subjective:   Pt seen and examined at bedside. No events overnight. Pain well controlled. Patient now ambulating OOB and passing flatus. Tolerating regular diet. Pt denies fever, chills, chest pain, SOB, nausea, vomiting, lightheadedness, dizziness.      Objective:  Vital Signs Last 24 Hrs  T(C): 36.3 (07 Apr 2024 05:22), Max: 36.9 (06 Apr 2024 10:37)  T(F): 97.4 (07 Apr 2024 05:22), Max: 98.4 (06 Apr 2024 10:37)  HR: 51 (07 Apr 2024 05:22) (51 - 64)  BP: 151/69 (07 Apr 2024 05:22) (136/60 - 156/75)  RR: 16 (07 Apr 2024 05:22) (16 - 18)  SpO2: 100% (07 Apr 2024 05:22) (95% - 100%)    Parameters below as of 07 Apr 2024 05:22  Patient On (Oxygen Delivery Method): room air    I&O's Summary    05 Apr 2024 07:01  -  06 Apr 2024 07:00  --------------------------------------------------------  IN: 1795 mL / OUT: 3440 mL / NET: -1645 mL    06 Apr 2024 07:01  -  07 Apr 2024 05:48  --------------------------------------------------------  IN: 1500 mL / OUT: 1900 mL / NET: -400 mL      CAPILLARY BLOOD GLUCOSE  POCT Blood Glucose.: 180 mg/dL (05 Apr 2024 21:03)  POCT Blood Glucose.: 147 mg/dL (05 Apr 2024 17:46)  POCT Blood Glucose.: 171 mg/dL (05 Apr 2024 17:13)  POCT Blood Glucose.: 176 mg/dL (05 Apr 2024 15:38)    MEDICATIONS  (STANDING):  acetaminophen     Tablet .. 975 milliGRAM(s) Oral every 6 hours  dextrose 10% Bolus 125 milliLiter(s) IV Bolus once  dextrose 5%. 1000 milliLiter(s) (100 mL/Hr) IV Continuous <Continuous>  dextrose 5%. 1000 milliLiter(s) (50 mL/Hr) IV Continuous <Continuous>  dextrose 50% Injectable 25 Gram(s) IV Push once  dextrose 50% Injectable 12.5 Gram(s) IV Push once  glucagon  Injectable 1 milliGRAM(s) IntraMuscular once  heparin   Injectable 5000 Unit(s) SubCutaneous every 8 hours  ibuprofen  Tablet. 600 milliGRAM(s) Oral every 6 hours  insulin lispro (ADMELOG) corrective regimen sliding scale   SubCutaneous three times a day before meals  insulin lispro (ADMELOG) corrective regimen sliding scale   SubCutaneous at bedtime  polyethylene glycol 3350 17 Gram(s) Oral daily  sodium chloride 0.9% lock flush 3 milliLiter(s) IV Push every 8 hours    MEDICATIONS  (PRN):  dextrose Oral Gel 15 Gram(s) Oral once PRN Blood Glucose LESS THAN 70 milliGRAM(s)/deciliter  metoprolol tartrate Injectable 5 milliGRAM(s) IV Push every 6 hours PRN BP systolic >160 or diastolic >110  ondansetron    Tablet 4 milliGRAM(s) Oral every 8 hours PRN Nausea and/or Vomiting  oxyCODONE    IR 5 milliGRAM(s) Oral every 3 hours PRN Severe Pain (7 - 10)  oxyCODONE    IR 2.5 milliGRAM(s) Oral every 3 hours PRN Moderate Pain (4 - 6)  senna 1 Tablet(s) Oral at bedtime PRN Constipation  simethicone 80 milliGRAM(s) Chew every 6 hours PRN Gas      Physical Exam:  Constitutional: NAD, A+O x3  CV: extremities well perfused  Lungs: normal respiratory effort on room air, good air flow b/l  Abdomen: softly distended, nontender. B/l inguinal incisions c/d/i with overlying bandages  : vulvar incision c/d/i with no surrounding erythema or swelling. Claros in place.  Extremities: no lower extremity edema or calf tenderness bilaterally; venodynes in place

## 2024-04-08 PROBLEM — I25.10 ATHEROSCLEROTIC HEART DISEASE OF NATIVE CORONARY ARTERY WITHOUT ANGINA PECTORIS: Chronic | Status: ACTIVE | Noted: 2024-04-02

## 2024-04-08 PROBLEM — Z87.39 PERSONAL HISTORY OF OTHER DISEASES OF THE MUSCULOSKELETAL SYSTEM AND CONNECTIVE TISSUE: Chronic | Status: ACTIVE | Noted: 2024-04-02

## 2024-04-08 LAB — GLUCOSE BLDC GLUCOMTR-MCNC: 116 MG/DL — HIGH (ref 70–99)

## 2024-04-11 ENCOUNTER — APPOINTMENT (OUTPATIENT)
Dept: UROLOGY | Facility: CLINIC | Age: 69
End: 2024-04-11
Payer: MEDICARE

## 2024-04-11 ENCOUNTER — APPOINTMENT (OUTPATIENT)
Dept: GYNECOLOGIC ONCOLOGY | Facility: CLINIC | Age: 69
End: 2024-04-11
Payer: MEDICARE

## 2024-04-11 VITALS
WEIGHT: 133 LBS | HEIGHT: 61 IN | SYSTOLIC BLOOD PRESSURE: 124 MMHG | BODY MASS INDEX: 25.11 KG/M2 | TEMPERATURE: 97.6 F | DIASTOLIC BLOOD PRESSURE: 71 MMHG | RESPIRATION RATE: 17 BRPM | HEART RATE: 66 BPM

## 2024-04-11 VITALS
OXYGEN SATURATION: 97 % | DIASTOLIC BLOOD PRESSURE: 76 MMHG | HEART RATE: 62 BPM | WEIGHT: 133 LBS | BODY MASS INDEX: 25.13 KG/M2 | TEMPERATURE: 97.7 F | RESPIRATION RATE: 18 BRPM | SYSTOLIC BLOOD PRESSURE: 148 MMHG

## 2024-04-11 DIAGNOSIS — N39.3 STRESS INCONTINENCE (FEMALE) (MALE): ICD-10-CM

## 2024-04-11 DIAGNOSIS — K59.00 CONSTIPATION, UNSPECIFIED: ICD-10-CM

## 2024-04-11 DIAGNOSIS — R35.0 FREQUENCY OF MICTURITION: ICD-10-CM

## 2024-04-11 DIAGNOSIS — R35.1 NOCTURIA: ICD-10-CM

## 2024-04-11 DIAGNOSIS — N81.10 CYSTOCELE, UNSPECIFIED: ICD-10-CM

## 2024-04-11 DIAGNOSIS — R39.9 UNSPECIFIED SYMPTOMS AND SIGNS INVOLVING THE GENITOURINARY SYSTEM: ICD-10-CM

## 2024-04-11 DIAGNOSIS — N81.4 UTEROVAGINAL PROLAPSE, UNSPECIFIED: ICD-10-CM

## 2024-04-11 DIAGNOSIS — N32.81 OVERACTIVE BLADDER: ICD-10-CM

## 2024-04-11 PROBLEM — E11.9 TYPE 2 DIABETES MELLITUS WITHOUT COMPLICATIONS: Chronic | Status: ACTIVE | Noted: 2024-04-02

## 2024-04-11 PROCEDURE — G2211 COMPLEX E/M VISIT ADD ON: CPT

## 2024-04-11 PROCEDURE — 51798 US URINE CAPACITY MEASURE: CPT

## 2024-04-11 PROCEDURE — 99459 PELVIC EXAMINATION: CPT

## 2024-04-11 PROCEDURE — 99205 OFFICE O/P NEW HI 60 MIN: CPT

## 2024-04-11 PROCEDURE — 99024 POSTOP FOLLOW-UP VISIT: CPT

## 2024-04-11 RX ORDER — VIBEGRON 75 MG/1
75 TABLET, FILM COATED ORAL
Qty: 90 | Refills: 0 | Status: ACTIVE | COMMUNITY
Start: 2024-04-11 | End: 1900-01-01

## 2024-04-11 NOTE — ASSESSMENT
[FreeTextEntry1] : 69 y/o woman with vulvar paget's disease who is s/p wide radical vulvar resection and bilateral inguinal SLN disection on 4/5/24.  - Vulvar pain: encouraged to decrease tylenol to maximum 4g/day (4x/day) and eventually tapering until pain improves and she does not need.  -The patient will return in 1 week for another evaluation of her wound. -She asked about driving over this weekend which I advised her not to.  She concern to be a passenger in the car. Her trip to [place] remained scheduled for end of April/early May.   Return in 1 week for follow up

## 2024-04-11 NOTE — REASON FOR VISIT
[FreeTextEntry1] : Presents for her post operative visit  S/p wide radical vulvar resection and bilateral inguinal sentinel lymph node dissection.

## 2024-04-11 NOTE — REVIEW OF SYSTEMS
[Negative] : Musculoskeletal [Hematuria] : no hematuria [Dysuria] : no dysuria [Vaginal Discharge] : no vaginal discharge [Chest Pain] : no chest pain [ALEXANDER] : no dyspnea on exertion [SOB on Exertion] : no shortness of breath during exertion [Diarrhea] : no diarrhea [Nausea] : no nausea/vomitting

## 2024-04-11 NOTE — HISTORY OF PRESENT ILLNESS
[FreeTextEntry1] : 69 y/o woman with vulvar paget's disease who is s/p wide radical vulvar resection and bilateral inguinal SLN dissection on 4/5/24.  The patient stayed overnight in the hospital.  She passed a voiding trial before discharge home 4/6/2024.  Her pathology is still pending.  The patient was first seen by me back on February 1, 2024 for an outside biopsy shows Paget's disease. Her examination of the perineum was not very obvious for Paget's disease. The patient had to point out the area where the biopsy was performed.  The treatment management unfortunately was not made immediately. We waited for quite a bit of time in order to obtain the outside slides for review here at [facility]. Furthermore our dermatopathologist requested unstained slides for further examination such as IHC staining. The final report here indicated a invasive Paget's disease of the vulva.  The patient came 2 weeks ago with complaint of significant stabbing pain in the perineal area which has now extended to the right side of the vulva. Additional biopsy adjacent to the labia minora on both sides were performed. Paget's disease was noted on the left labia minora next to the affected lesion. The right sided vulvar biopsy was negative for disease. The patient was given bacitracin and lidocaine cream for her stabbing pain in the perineum. She reported improvement on her discomfort.  In her last visit we explained to patient that she has Paget's disease with area of invasion which we cannot determine the depth due to how the specimen was procured. Her main complaint was her stabbing pain/discomfort which she said that pushing it down on the skin actually improved her symptoms which has improved with the medication prescribed.  Her PET CT scan showed no evidence of any metastatic disease.  4/11/24: Patient is here for her post-op visit. She reports eating well. She does have pain which she takes tylenol extra strength 2 tablets 3-4x/day. Takes miralax with +BM. She wants to drive for 2-hours on saturday; discouraged pt she needs more time to heal. Denies any other associated symptoms.     Her colonoscopy was done in 2023 by Dr. Jorge gastroenterology. Her report is in the chart Her sonogram of the breast in 10/2 3 was BI-RADS 3 HPV testing 3/14/2024 was negative An endometrial biopsy on 3/14/2024 was also negative

## 2024-04-11 NOTE — PHYSICAL EXAM
[Chaperone Present] : A chaperone was present in the examining room during all aspects of the physical examination [Restricted in physically strenuous activity but ambulatory and able to carry out work of a light or sedentary nature] : Status 1- Restricted in physically strenuous activity but ambulatory and able to carry out work of a light or sedentary nature, e.g., light house work, office work [FreeTextEntry2] : Melinda [Normal] : no adenopathy noted in inguinal lymph nodes [de-identified] : see gynecological [de-identified] : Her incision site appeared to heal well.  There is no breakdown.  There was some swelling noted around the entire perineum.  There was no erythema or evidence of infection.  We cleaned the wound with flanges cc of normal saline. [de-identified] :  right labia [de-identified] : B/l groin sutures intact; no drainage noted

## 2024-04-11 NOTE — LETTER BODY
[Attached please find my note.] : Attached please find my note. [FreeTextEntry2] : Carlitos Vergara MD [id number] [place] [address] Tel [phone]   Dear Dr Vergara   Ms Noel Aquino was seen in my office for a post operative check up.  Please see my consult note for her plan of care.  Thank you for allowing me to participate in the care of this patient.    Please do not hesitate to call if you have any questions.    Most Sincerely,      Yvon Martínez MD [facility] Director, [facility] Division of Gynecologic Oncology Department Obstetrics and Gynecology Tel [phone] [email or web address]

## 2024-04-12 LAB
APPEARANCE: CLEAR
BILIRUBIN URINE: NEGATIVE
BLOOD URINE: NEGATIVE
COLOR: YELLOW
GLUCOSE QUALITATIVE U: NEGATIVE MG/DL
KETONES URINE: NEGATIVE MG/DL
LEUKOCYTE ESTERASE URINE: NEGATIVE
NITRITE URINE: NEGATIVE
PH URINE: 6.5
PROTEIN URINE: NEGATIVE MG/DL
SPECIFIC GRAVITY URINE: 1.01
UROBILINOGEN URINE: 0.2 MG/DL

## 2024-04-18 ENCOUNTER — APPOINTMENT (OUTPATIENT)
Dept: GYNECOLOGIC ONCOLOGY | Facility: CLINIC | Age: 69
End: 2024-04-18
Payer: MEDICARE

## 2024-04-18 ENCOUNTER — NON-APPOINTMENT (OUTPATIENT)
Age: 69
End: 2024-04-18

## 2024-04-18 VITALS
SYSTOLIC BLOOD PRESSURE: 134 MMHG | OXYGEN SATURATION: 97 % | WEIGHT: 136 LBS | BODY MASS INDEX: 25.7 KG/M2 | HEART RATE: 63 BPM | TEMPERATURE: 97.7 F | RESPIRATION RATE: 18 BRPM | DIASTOLIC BLOOD PRESSURE: 78 MMHG

## 2024-04-18 PROCEDURE — 99024 POSTOP FOLLOW-UP VISIT: CPT

## 2024-04-18 NOTE — REVIEW OF SYSTEMS
[Hematuria] : no hematuria [Dysuria] : no dysuria [Vaginal Discharge] : no vaginal discharge [Chest Pain] : no chest pain [ALEXANDER] : no dyspnea on exertion [SOB on Exertion] : no shortness of breath during exertion [Diarrhea] : no diarrhea [Nausea] : no nausea/vomitting

## 2024-04-18 NOTE — HISTORY OF PRESENT ILLNESS
[FreeTextEntry1] : The patient was status post a wide radical excision of her vulvar with bilateral sentinel inguinal lymph node evaluation April 5 2024 at [facility].  The patient was stable and was discharged the following day.  4/11/24: Patient is here for her post-op visit. She reports eating well. She does have pain which she takes tylenol extra strength 2 tablets 3-4x/day. Takes miralax with +BM. She wants to drive for 2-hours on saturday; discouraged pt she needs more time to heal. Denies any other associated symptoms.  4/18/24: Patient is here for her second post-op visit. She is s/p visit with urology.   The patient complaining of again some stinging pain in the area of the incision.  She has not had any of the sensation that felt prior to the procedure.  Patient is ready to travel in 2 weeks to [place] for her visit to the mother.  She requested a cushion to help support her buttock and her lower back when she sits for too long of a period.  She also requested a disability parking pass  I showed her some of the pillow donut that she can purchase for her to sit on top of it therefore minimize discomfort of the perineum.  Furthermore explained that if the request is temporary due to her condition I will try to fill that.  otherwise I would not be doing a permanent disability pass for her.   Her colonoscopy was done in 2023 by Dr. Jorge gastroenterology. Her report is in the chart Her sonogram of the breast in 10/2 3 was BI-RADS 3 HPV testing 3/14/2024 was negative An endometrial biopsy on 3/14/2024 was also negative

## 2024-04-18 NOTE — REASON FOR VISIT
[FreeTextEntry1] :     Presents for her second post operative visit S/p wide radical vulvar resection and bilateral inguinal sentinel lymph node dissection.

## 2024-04-18 NOTE — PHYSICAL EXAM
[FreeTextEntry2] : Melinda [de-identified] : a ringworm like lesion on the right inner thigh.  nystatin prescribed for the patient [de-identified] : Her incision site appeared to heal well.  There is no breakdown.  There was some swelling noted around the entire perineum.  There was no erythema or evidence of infection.  We cleaned the wound with flanges cc of normal saline. [de-identified] :  right labia [de-identified] : B/l groin sutures intact; no drainage noted

## 2024-04-18 NOTE — ASSESSMENT
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  Nystatin powder prescribed for patient for possible ringworm. Patient will bring back request of a temporary parking pass for disabled patient. Still awaiting pathology report Return to office at the end of April for a another postop check

## 2024-04-19 LAB — SURGICAL PATHOLOGY STUDY: SIGNIFICANT CHANGE UP

## 2024-04-30 ENCOUNTER — APPOINTMENT (OUTPATIENT)
Dept: GYNECOLOGIC ONCOLOGY | Facility: CLINIC | Age: 69
End: 2024-04-30
Payer: MEDICARE

## 2024-04-30 VITALS
BODY MASS INDEX: 25.7 KG/M2 | WEIGHT: 136 LBS | HEART RATE: 78 BPM | SYSTOLIC BLOOD PRESSURE: 137 MMHG | RESPIRATION RATE: 16 BRPM | DIASTOLIC BLOOD PRESSURE: 69 MMHG | OXYGEN SATURATION: 95 %

## 2024-04-30 DIAGNOSIS — C51.9 MALIGNANT NEOPLASM OF VULVA, UNSPECIFIED: ICD-10-CM

## 2024-04-30 PROCEDURE — 99024 POSTOP FOLLOW-UP VISIT: CPT

## 2024-04-30 RX ORDER — LIDOCAINE 4% 4 G/100G
4 CREAM TOPICAL
Qty: 1 | Refills: 2 | Status: ACTIVE | COMMUNITY
Start: 2024-04-30 | End: 1900-01-01

## 2024-04-30 NOTE — HISTORY OF PRESENT ILLNESS
[FreeTextEntry1] : The patient was status post a wide radical excision of her vulvar with bilateral sentinel inguinal lymph node evaluation April 5 2024 at [facility]. The patient was stable and was discharged the following day.  Final Diagnosis Inguinal sentinel lymph node, left, lymph node dissection: Three lymph nodes, negative for malignancy (0/3).  Paget disease, extramammary. Peripheral margin from 10 to 6 o'clock is positive for Paget disease.  4/11/24: Patient is here for her post-op visit. She reports eating well. She does have pain which she takes tylenol extra strength 2 tablets 3-4x/day. Takes miralax with +BM. She wants to drive for 2-hours on saturday; discouraged pt she needs more time to heal. Denies any other associated symptoms.  4/18/24: Patient is here for her second post-op visit. She is s/p visit with urology.  The patient complaining of again some stinging pain in the area of the incision. She has not had any of the sensation that felt prior to the procedure.  Patient is ready to travel in 2 weeks to [place] for her visit to the mother. She requested a cushion to help support her buttock and her lower back when she sits for too long of a period.  She also requested a disability parking pass  I showed her some of the pillow donut that she can purchase for her to sit on top of it therefore minimize discomfort of the perineum. Furthermore explained that if the request is temporary due to her condition I will try to fill that. otherwise I would not be doing a permanent disability pass for her.  4/30/24: Presents for post-operative visit. Reports burning and mild swelling at vaginal area. She is sitting better with the donut that she purchased but still challenging to sit for long. Unable to wear pants as it feels uncomfortable. Still has itching in inner thigh, possible ringworm. Did not get prescription. Pain resolved. No pain medication. She's planning to travel to [place] this week.   Her colonoscopy was done in 2023 by Dr. Jorge gastroenterology. Her report is in the chart Her sonogram of the breast in 10/2 3 was BI-RADS 3 HPV testing 3/14/2024 was negative An endometrial biopsy on 3/14/2024 was also negative  Surgical Pathology Report - Auth (Verified)  Specimen(s) Submitted 1-Left inguinal sentinel lymph node 2-Right ingunal sentinel lymph node 3-Partial vulvectomy  Final Diagnosis 1. Inguinal sentinel lymph node, left, lymph node dissection: - Three lymph nodes, negative for malignancy (0/3). Tissue was examined using a sentinel lymph node protocol consisting of alternation  hematoxylin  and eosin stained slides with cytokeratin AE1/AE3 immunohistochemical studies.  2. Inguinal sentinel lymph node, right, lymph node dissection: - One lymph node, negative for malignancy (0/1).  Tissue was examined using a sentinel lymph node protocol consisting of alternation  hematoxylin  and eosin stained slides with cytokeratin AE1/AE3 immunohistochemical studies.  3. Vulva, partial vulvectomy: - Paget disease, extramammary. - Peripheral margin from 10 to 6 o'clock is positive for Paget disease. - See note.  Oncology history  11/28/2023 Paget's disease with possible invasion on review of the slides  4/5/2023 bilateral sentinel node evaluation and wide radical local excision

## 2024-04-30 NOTE — LETTER BODY
[Attached please find my note.] : Attached please find my note. [FreeTextEntry2] : Carlitos Vergara MD [address] [place] [address] Tel [phone]  Dear Dr Vergara   Ms Jacy Aquino was seen in my office for a post operative checku p.  Please see my consult note for her plan of care.  Thank you for allowing me to participate in the care of this patient.    Please do not hesitate to call if you have any questions.    Most Sincerely,       Yvon Martínez MD [facility] Director, [facility] Division of Gynecologic Oncology Department Obstetrics and Gynecology Tel [phone] [email or web address]  [FreeTextEntry1] : pathology report

## 2024-04-30 NOTE — ASSESSMENT
[FreeTextEntry1] : 67 y/o woman with vulvar paget's disease who is s/p wide radical vulvar resection and bilateral inguinal SLN biopsy on 4/5/24. Final Diagnosis Inguinal sentinel lymph node, left, lymph node dissection: Three lymph nodes, negative for malignancy (0/3).  Paget disease, extramammary. Peripheral margin from 10 to 6 o'clock is positive for Paget disease.  Nystatin powder prescribed for patient for possible ringworm.  However patient did not .  The lesions appear to have improved without any medication. She is requesting for more topical ointment to apply to the vulvar area.  I have written her for more bacitracin ointment and also lidocaine ointment.  We discussed the pathology report in detail.  Patient knows about the positive margin at the 6-10 o'clock region.  We will wait for the area to heal better before making any further management decision.  The option reexcision of the area or laser ablations of the area.  Patient will bring back request of a temporary parking pass for disabled patient.  The patient surgery is back in April 5, over 3 weeks ago.  Will have to review the final presence of this parking pass request before approving it.  There is still some swelling of the left groin area and also the primary incision site.  Advised her to use heating pad to the area. She is leaving later this week to [place] to visit her mother.  She will be back at the end of May.  I have advised her to return to my office at the end of May or early June for follow-up.

## 2024-05-01 ENCOUNTER — NON-APPOINTMENT (OUTPATIENT)
Age: 69
End: 2024-05-01

## 2024-06-03 NOTE — DISCHARGE NOTE NURSING/CASE MANAGEMENT/SOCIAL WORK - NSDCPEFALRISK_GEN_ALL_CORE
For information on Fall & Injury Prevention, visit: [email or web address] OR  [email or web address] OR  https://www.cdc.gov/steadi/patient.html
No

## 2024-06-05 ENCOUNTER — NON-APPOINTMENT (OUTPATIENT)
Age: 69
End: 2024-06-05

## 2024-06-06 ENCOUNTER — APPOINTMENT (OUTPATIENT)
Dept: GYNECOLOGIC ONCOLOGY | Facility: CLINIC | Age: 69
End: 2024-06-06
Payer: MEDICARE

## 2024-06-06 VITALS
WEIGHT: 139.7 LBS | RESPIRATION RATE: 16 BRPM | DIASTOLIC BLOOD PRESSURE: 76 MMHG | TEMPERATURE: 97.2 F | BODY MASS INDEX: 26.4 KG/M2 | HEART RATE: 60 BPM | OXYGEN SATURATION: 95 % | SYSTOLIC BLOOD PRESSURE: 132 MMHG

## 2024-06-06 DIAGNOSIS — C44.99 OTHER SPECIFIED MALIGNANT NEOPLASM OF SKIN, UNSPECIFIED: ICD-10-CM

## 2024-06-06 PROCEDURE — 99024 POSTOP FOLLOW-UP VISIT: CPT

## 2024-06-06 NOTE — HISTORY OF PRESENT ILLNESS
[FreeTextEntry1] : Patient was status post a wide radical resection of her vulvar along with bilateral sentinel inguinal lymph node biopsy April 5 2024.  Patient had postop check already in end of April and early May. She feels well today.  She just returned from [place] visiting her mother.  She complained of slight discomfort in the vaginal area adjacent to her wound. Otherwise the wound itself has not giving her any trouble as the Paget's disease did to her prior to the surgery.  She has no bleeding no dysuria.   Specimen(s) Submitted 1-Left inguinal sentinel lymph node 2-Right ingunal sentinel lymph node 3-Partial vulvectomy  Final Diagnosis 1. Inguinal sentinel lymph node, left, lymph node dissection: - Three lymph nodes, negative for malignancy (0/3).   Tissue was examined using a sentinel lymph node protocol consisting of alternation  hematoxylin  and eosin stained slides with cytokeratin AE1/AE3 immunohistochemical studies.  2. Inguinal sentinel lymph node, right, lymph node dissection: - One lymph node, negative for malignancy (0/1).  Tissue was examined using a sentinel lymph node protocol consisting of alternation  hematoxylin  and eosin stained slides with cytokeratin AE1/AE3 immunohistochemical studies.  3. Vulva, partial vulvectomy: - Paget disease, extramammary. - Peripheral margin from 10 to 6 o'clock is positive for Paget disease. - See note.  Note: No unequivocal,  definitive  invasion is identified.  Additional levels  2/23 mammogram BIRAD 3 10/23 ultrasound BIRAD 3 right side 2023 colonoscopy benign polyp noted pap done with gyn were examined. The tissue was entirely submitted for   histologic examination.

## 2024-06-06 NOTE — LETTER BODY
[Attached please find my note.] : Attached please find my note. [FreeTextEntry2] : Carlitos Vergara MD [address] [place] [address] Tel [phone]   Dear Dr Vergara   Ms Kenia was seen in my office for a post operative checkup.  Please see my consult note for her plan of care.  Thank you for allowing me to participate in the care of this patient.   Please do not hesitate to call if you have any questions.    Most Sincerely,       Yvon Martínez MD [facility] [place] [facility] Director, [facility] Professor of Obstetrics and Gynecology, [facility] [employer] [facility] Division of Gynecologic Oncology Department Obstetrics and Gynecology Tel [phone] [email or web address]

## 2024-06-06 NOTE — ASSESSMENT
[FreeTextEntry1] : Patient is clinically stable.  Return to office in 2 months for further follow-up Will check on that border again at the 6-9 10:00 region Advised patient that we may need to do some biopsy along this area next time. Patient may return earlier if there is any further symptomatic issues.

## 2024-06-06 NOTE — PHYSICAL EXAM
[Normal] : Bimanual Exam: Normal [de-identified] : Incision is healed already and looks well except for obviously the missing portion of the vulva.  This is mostly on the left side.  There is a slight redness along the incision site adjacent to the left side of the vagina where the positive Paget's disease was noted along the 6-10 o'clock area.  There is a first-degree cystocele [Fully active, able to carry on all pre-disease performance without restriction] : Status 0 - Fully active, able to carry on all pre-disease performance without restriction

## 2024-06-28 ENCOUNTER — RX RENEWAL (OUTPATIENT)
Age: 69
End: 2024-06-28

## 2024-07-18 ENCOUNTER — APPOINTMENT (OUTPATIENT)
Dept: UROLOGY | Facility: CLINIC | Age: 69
End: 2024-07-18
Payer: MEDICARE

## 2024-07-18 VITALS
SYSTOLIC BLOOD PRESSURE: 144 MMHG | RESPIRATION RATE: 16 BRPM | HEART RATE: 69 BPM | DIASTOLIC BLOOD PRESSURE: 67 MMHG | OXYGEN SATURATION: 99 %

## 2024-07-18 DIAGNOSIS — N81.4 UTEROVAGINAL PROLAPSE, UNSPECIFIED: ICD-10-CM

## 2024-07-18 DIAGNOSIS — R35.1 NOCTURIA: ICD-10-CM

## 2024-07-18 DIAGNOSIS — R33.9 RETENTION OF URINE, UNSPECIFIED: ICD-10-CM

## 2024-07-18 DIAGNOSIS — N32.81 OVERACTIVE BLADDER: ICD-10-CM

## 2024-07-18 DIAGNOSIS — N81.10 CYSTOCELE, UNSPECIFIED: ICD-10-CM

## 2024-07-18 DIAGNOSIS — N39.3 STRESS INCONTINENCE (FEMALE) (MALE): ICD-10-CM

## 2024-07-18 PROCEDURE — 51798 US URINE CAPACITY MEASURE: CPT

## 2024-07-18 PROCEDURE — 99215 OFFICE O/P EST HI 40 MIN: CPT

## 2024-07-18 PROCEDURE — G2211 COMPLEX E/M VISIT ADD ON: CPT

## 2024-07-18 RX ORDER — TAMSULOSIN HYDROCHLORIDE 0.4 MG/1
0.4 CAPSULE ORAL
Qty: 90 | Refills: 0 | Status: ACTIVE | COMMUNITY
Start: 2024-07-18 | End: 1900-01-01

## 2024-07-22 LAB
APPEARANCE: CLEAR
BACTERIA UR CULT: NORMAL
BACTERIA: NEGATIVE /HPF
BILIRUBIN URINE: NEGATIVE
BLOOD URINE: NEGATIVE
CAST: 0 /LPF
COLOR: YELLOW
EPITHELIAL CELLS: 0 /HPF
GLUCOSE QUALITATIVE U: NEGATIVE MG/DL
KETONES URINE: NEGATIVE MG/DL
LEUKOCYTE ESTERASE URINE: ABNORMAL
MICROSCOPIC-UA: NORMAL
NITRITE URINE: NEGATIVE
PH URINE: 7
PROTEIN URINE: NORMAL MG/DL
RED BLOOD CELLS URINE: 2 /HPF
SPECIFIC GRAVITY URINE: 1.01
UROBILINOGEN URINE: 0.2 MG/DL
WHITE BLOOD CELLS URINE: 0 /HPF

## 2024-08-08 ENCOUNTER — APPOINTMENT (OUTPATIENT)
Dept: GYNECOLOGIC ONCOLOGY | Facility: CLINIC | Age: 69
End: 2024-08-08

## 2024-08-08 PROCEDURE — 99459 PELVIC EXAMINATION: CPT

## 2024-08-08 PROCEDURE — 99213 OFFICE O/P EST LOW 20 MIN: CPT

## 2024-08-08 PROCEDURE — G2211 COMPLEX E/M VISIT ADD ON: CPT

## 2024-08-08 NOTE — ASSESSMENT
[FreeTextEntry1] : Status post surgery for extramammary Paget's disease of the vulva.  Her exam today was unremarkable. There is no evidence of any disease that is consistent with recurrent Paget's Continue to follow at this time. No biopsy is recommended today.  Return to office in 3 months.

## 2024-08-08 NOTE — HISTORY OF PRESENT ILLNESS
[FreeTextEntry1] : Patient is here for a surveillance checkup of her vulvar status post a left wide radical excision for her Paget's disease.    Patient was status post a wide radical resection of her vulvar along with bilateral sentinel inguinal lymph node biopsy April 5 2024.  Patient had postop check already in end of April and early May. She feels well today.  She just returned from [place] visiting her mother.  The patient has no complaints today.  She has no bleeding no pain in the incision site.  She did state that occasionally she may feel a little tingling stabbing sensation but that its only transient.  4/5/24 surgery for a wide left radical excision plus bilateral sentinel node mapping and biopsy  Specimen(s) Submitted 1-Left inguinal sentinel lymph node 2-Right ingunal sentinel lymph node 3-Partial vulvectomy  Final Diagnosis 1. Inguinal sentinel lymph node, left, lymph node dissection: - Three lymph nodes, negative for malignancy (0/3).   Tissue was examined using a sentinel lymph node protocol consisting of alternation  hematoxylin  and eosin stained slides with cytokeratin AE1/AE3 immunohistochemical studies.  2. Inguinal sentinel lymph node, right, lymph node dissection: - One lymph node, negative for malignancy (0/1).  Tissue was examined using a sentinel lymph node protocol consisting of alternation  hematoxylin  and eosin stained slides with cytokeratin AE1/AE3 immunohistochemical studies.  3. Vulva, partial vulvectomy: - Paget disease, extramammary. - Peripheral margin from 10 to 6 o'clock is positive for Paget disease. - See note.  Note: No unequivocal,  definitive  invasion is identified.  Additional levels  2/23 mammogram BIRAD 3 10/23 ultrasound BIRAD 3 right side 2023 colonoscopy benign polyp noted pap done with gyn were examined. The tissue was entirely submitted for   histologic examination.

## 2024-08-08 NOTE — PHYSICAL EXAM
[Chaperone Present] : A chaperone was present in the examining room during all aspects of the physical examination [[id number]] : A chaperone was present during the pelvic exam. [Absent] : CVAT: absent [Normal] : Recto-Vaginal Exam: Normal [Fully active, able to carry on all pre-disease performance without restriction] : Status 0 - Fully active, able to carry on all pre-disease performance without restriction [FreeTextEntry2] : Ellen [de-identified] : First-degree cystocele noted.  Left vulvar incision site has essentially healed.  There is no erythema there is no lesion noted along the incision site that is consistent with Paget's disease.  Will hold off on any biopsy today. [de-identified] : Slight erythema noted on the right side of the vaginal apex away from the cervix.  No lesion was noted.  No blood was seen. [de-identified] : Cervix is deviated a little bit to the left side  [de-identified] : Uterus is mobile normal size nontender [de-identified] : No adnexal masses are palpated

## 2024-10-31 ENCOUNTER — APPOINTMENT (OUTPATIENT)
Dept: UROLOGY | Facility: CLINIC | Age: 69
End: 2024-10-31
Payer: MEDICARE

## 2024-10-31 VITALS
SYSTOLIC BLOOD PRESSURE: 148 MMHG | HEART RATE: 63 BPM | HEIGHT: 61 IN | DIASTOLIC BLOOD PRESSURE: 72 MMHG | RESPIRATION RATE: 16 BRPM | OXYGEN SATURATION: 97 % | WEIGHT: 136 LBS | BODY MASS INDEX: 25.68 KG/M2

## 2024-10-31 DIAGNOSIS — K59.00 CONSTIPATION, UNSPECIFIED: ICD-10-CM

## 2024-10-31 DIAGNOSIS — R35.1 NOCTURIA: ICD-10-CM

## 2024-10-31 DIAGNOSIS — N39.3 STRESS INCONTINENCE (FEMALE) (MALE): ICD-10-CM

## 2024-10-31 DIAGNOSIS — N81.10 CYSTOCELE, UNSPECIFIED: ICD-10-CM

## 2024-10-31 DIAGNOSIS — R33.9 RETENTION OF URINE, UNSPECIFIED: ICD-10-CM

## 2024-10-31 DIAGNOSIS — R35.0 FREQUENCY OF MICTURITION: ICD-10-CM

## 2024-10-31 DIAGNOSIS — N32.81 OVERACTIVE BLADDER: ICD-10-CM

## 2024-10-31 DIAGNOSIS — N95.2 POSTMENOPAUSAL ATROPHIC VAGINITIS: ICD-10-CM

## 2024-10-31 PROCEDURE — 99214 OFFICE O/P EST MOD 30 MIN: CPT

## 2024-10-31 PROCEDURE — G2211 COMPLEX E/M VISIT ADD ON: CPT

## 2024-11-07 ENCOUNTER — APPOINTMENT (OUTPATIENT)
Dept: GYNECOLOGIC ONCOLOGY | Facility: CLINIC | Age: 69
End: 2024-11-07

## 2024-11-26 ENCOUNTER — APPOINTMENT (OUTPATIENT)
Dept: GYNECOLOGIC ONCOLOGY | Facility: CLINIC | Age: 69
End: 2024-11-26
Payer: MEDICARE

## 2024-11-26 VITALS
WEIGHT: 130 LBS | TEMPERATURE: 97.7 F | OXYGEN SATURATION: 96 % | SYSTOLIC BLOOD PRESSURE: 114 MMHG | RESPIRATION RATE: 16 BRPM | HEART RATE: 76 BPM | BODY MASS INDEX: 24.56 KG/M2 | DIASTOLIC BLOOD PRESSURE: 65 MMHG

## 2024-11-26 DIAGNOSIS — C44.99 OTHER SPECIFIED MALIGNANT NEOPLASM OF SKIN, UNSPECIFIED: ICD-10-CM

## 2024-11-26 PROCEDURE — 99213 OFFICE O/P EST LOW 20 MIN: CPT

## 2024-11-26 PROCEDURE — 99459 PELVIC EXAMINATION: CPT

## (undated) DEVICE — NDL HYPO SAFE 18G X 1.5" (PINK)

## (undated) DEVICE — PROTECTOR HEEL / ELBOW FLUFFY

## (undated) DEVICE — LIGASURE EXACT DISSECTOR

## (undated) DEVICE — GLV 6.5 PROTEXIS (BLUE)

## (undated) DEVICE — POSITIONER PINK PAD PIGAZZI SYSTEM

## (undated) DEVICE — SUT VICRYL 2-0 27" SH UNDYED

## (undated) DEVICE — SUT VICRYL 4-0 27" RB-1 UNDYED

## (undated) DEVICE — STAPLER SKIN MULTI DIRECTION W35

## (undated) DEVICE — ELCTR BOVIE TIP BLADE INSULATED 6.5" EDGE

## (undated) DEVICE — GLV 6.5 PROTEXIS (WHITE)

## (undated) DEVICE — FOLEY TRAY 16FR 5CC LF UMETER CLOSED

## (undated) DEVICE — PREP BETADINE KIT

## (undated) DEVICE — DRAPE SPY PHI

## (undated) DEVICE — ELCTR BOVIE PENCIL SMOKE EVACUATION

## (undated) DEVICE — SPONGE X-RAY 4X8"

## (undated) DEVICE — POSITIONER STRAP ARMBOARD VELCRO TS-30

## (undated) DEVICE — VENODYNE/SCD SLEEVE CALF MEDIUM

## (undated) DEVICE — DRSG GAUZE VASELINE PETROLEUM 3 X 18"

## (undated) DEVICE — WARMING BLANKET FULL ADULT

## (undated) DEVICE — ELCTR GROUNDING PAD ADULT COVIDIEN

## (undated) DEVICE — SYR LUER LOK 10CC

## (undated) DEVICE — PACK PERI GYN

## (undated) DEVICE — SUT VICRYL 3-0 27" SH UNDYED

## (undated) DEVICE — PACK GENERAL MINOR